# Patient Record
Sex: FEMALE | Race: WHITE | NOT HISPANIC OR LATINO | Employment: FULL TIME | URBAN - METROPOLITAN AREA
[De-identification: names, ages, dates, MRNs, and addresses within clinical notes are randomized per-mention and may not be internally consistent; named-entity substitution may affect disease eponyms.]

---

## 2020-10-10 ENCOUNTER — APPOINTMENT (EMERGENCY)
Dept: RADIOLOGY | Facility: HOSPITAL | Age: 25
End: 2020-10-10

## 2020-10-10 ENCOUNTER — HOSPITAL ENCOUNTER (EMERGENCY)
Facility: HOSPITAL | Age: 25
Discharge: HOME | End: 2020-10-10
Attending: EMERGENCY MEDICINE | Admitting: EMERGENCY MEDICINE

## 2020-10-10 VITALS
HEART RATE: 83 BPM | SYSTOLIC BLOOD PRESSURE: 120 MMHG | WEIGHT: 177 LBS | RESPIRATION RATE: 18 BRPM | OXYGEN SATURATION: 98 % | DIASTOLIC BLOOD PRESSURE: 65 MMHG | TEMPERATURE: 97.9 F

## 2020-10-10 DIAGNOSIS — R11.2 NAUSEA AND VOMITING: Primary | ICD-10-CM

## 2020-10-10 LAB
ALBUMIN SERPL BCP-MCNC: 4.3 G/DL (ref 3.4–4.8)
ALP SERPL-CCNC: 92.4 U/L (ref 35–140)
ALT SERPL W P-5'-P-CCNC: 58 U/L (ref 5–54)
ANION GAP SERPL CALCULATED.3IONS-SCNC: 9 MMOL/L (ref 4–13)
AST SERPL W P-5'-P-CCNC: 25 U/L (ref 15–41)
ATRIAL RATE: 89 BPM
BASE EXCESS BLDA CALC-SCNC: 0 MMOL/L (ref -2–3)
BASOPHILS # BLD AUTO: 0.02 THOUSANDS/ΜL (ref 0–0.1)
BASOPHILS NFR BLD AUTO: 0 % (ref 0–1)
BETA-HYDROXYBUTYRATE: 0.5 MMOL/L
BILIRUB SERPL-MCNC: 0.94 MG/DL (ref 0.3–1.2)
BILIRUB UR QL STRIP: NEGATIVE
BUN SERPL-MCNC: 12 MG/DL (ref 6–20)
CA-I BLD-SCNC: 1.16 MMOL/L (ref 1.12–1.32)
CALCIUM SERPL-MCNC: 9 MG/DL (ref 8.4–10.2)
CHLORIDE SERPL-SCNC: 100 MMOL/L (ref 96–108)
CLARITY UR: ABNORMAL
CO2 SERPL-SCNC: 26 MMOL/L (ref 22–33)
COLOR UR: YELLOW
CREAT SERPL-MCNC: 0.5 MG/DL (ref 0.4–1.1)
EOSINOPHIL # BLD AUTO: 0.03 THOUSAND/ΜL (ref 0–0.61)
EOSINOPHIL NFR BLD AUTO: 0 % (ref 0–6)
ERYTHROCYTE [DISTWIDTH] IN BLOOD BY AUTOMATED COUNT: 14.5 % (ref 11.6–15.1)
FIO2 GAS DIL.REBREATH: 21 L
GFR SERPL CREATININE-BSD FRML MDRD: 133 ML/MIN/1.73SQ M
GLUCOSE SERPL-MCNC: 160 MG/DL (ref 65–140)
GLUCOSE SERPL-MCNC: 208 MG/DL (ref 65–140)
GLUCOSE SERPL-MCNC: 220 MG/DL (ref 65–140)
GLUCOSE UR STRIP-MCNC: ABNORMAL MG/DL
HCO3 BLDA-SCNC: 25.6 MMOL/L (ref 24–30)
HCT VFR BLD AUTO: 37 % (ref 34.8–46.1)
HCT VFR BLD CALC: 36 % (ref 34.8–46.1)
HGB BLD-MCNC: 12.1 G/DL (ref 11.5–15.4)
HGB BLDA-MCNC: 12.2 G/DL (ref 11.5–15.4)
HGB UR QL STRIP.AUTO: NEGATIVE
IMM GRANULOCYTES # BLD AUTO: 0.01 THOUSAND/UL (ref 0–0.2)
IMM GRANULOCYTES NFR BLD AUTO: 0 % (ref 0–2)
KETONES UR STRIP-MCNC: ABNORMAL MG/DL
LEUKOCYTE ESTERASE UR QL STRIP: NEGATIVE
LYMPHOCYTES # BLD AUTO: 0.99 THOUSANDS/ΜL (ref 0.6–4.47)
LYMPHOCYTES NFR BLD AUTO: 9 % (ref 14–44)
MCH RBC QN AUTO: 27.8 PG (ref 26.8–34.3)
MCHC RBC AUTO-ENTMCNC: 32.7 G/DL (ref 31.4–37.4)
MCV RBC AUTO: 85 FL (ref 82–98)
MONOCYTES # BLD AUTO: 0.45 THOUSAND/ΜL (ref 0.17–1.22)
MONOCYTES NFR BLD AUTO: 4 % (ref 4–12)
NEUTROPHILS # BLD AUTO: 9.79 THOUSANDS/ΜL (ref 1.85–7.62)
NEUTS SEG NFR BLD AUTO: 87 % (ref 43–75)
NITRITE UR QL STRIP: NEGATIVE
P AXIS: 71 DEGREES
PCO2 BLD: 27 MMOL/L (ref 21–32)
PCO2 BLD: 42 MM HG (ref 42–50)
PCO2 BLD: 44 MM HG
PCO2 BLDA: 41.3 MM HG
PH BLD: 7.39 [PH] (ref 7.3–7.4)
PH BLD: 7.4 [PH]
PH UR STRIP.AUTO: 6 [PH]
PLATELET # BLD AUTO: 360 THOUSANDS/UL (ref 149–390)
PMV BLD AUTO: 10.1 FL (ref 8.9–12.7)
PO2 BLD: 45 MM HG (ref 35–45)
POTASSIUM BLD-SCNC: 3.6 MMOL/L (ref 3.5–5.3)
POTASSIUM SERPL-SCNC: 4 MMOL/L (ref 3.5–5)
PR INTERVAL: 161 MS
PROT SERPL-MCNC: 7.3 G/DL (ref 6.4–8.3)
PROT UR STRIP-MCNC: NEGATIVE MG/DL
QRS AXIS: 65 DEGREES
QRSD INTERVAL: 82 MS
QT INTERVAL: 362 MS
QTC INTERVAL: 438 MS
RBC # BLD AUTO: 4.35 MILLION/UL (ref 3.81–5.12)
SAO2 % BLD FROM PO2: 80 % (ref 60–85)
SODIUM BLD-SCNC: 134 MMOL/L (ref 136–145)
SODIUM SERPL-SCNC: 135 MMOL/L (ref 133–145)
SP GR UR STRIP.AUTO: >=1.03 (ref 1–1.03)
SPECIMEN SOURCE: ABNORMAL
T WAVE AXIS: 32 DEGREES
UROBILINOGEN UR QL STRIP.AUTO: 0.2 E.U./DL
VENTRICULAR RATE: 88 BPM
WBC # BLD AUTO: 11.29 THOUSAND/UL (ref 4.31–10.16)

## 2020-10-10 PROCEDURE — 82803 BLOOD GASES ANY COMBINATION: CPT

## 2020-10-10 PROCEDURE — 82330 ASSAY OF CALCIUM: CPT

## 2020-10-10 PROCEDURE — 96374 THER/PROPH/DIAG INJ IV PUSH: CPT

## 2020-10-10 PROCEDURE — 82948 REAGENT STRIP/BLOOD GLUCOSE: CPT

## 2020-10-10 PROCEDURE — 82947 ASSAY GLUCOSE BLOOD QUANT: CPT

## 2020-10-10 PROCEDURE — 82010 KETONE BODYS QUAN: CPT | Performed by: EMERGENCY MEDICINE

## 2020-10-10 PROCEDURE — 99284 EMERGENCY DEPT VISIT MOD MDM: CPT | Performed by: EMERGENCY MEDICINE

## 2020-10-10 PROCEDURE — 96375 TX/PRO/DX INJ NEW DRUG ADDON: CPT

## 2020-10-10 PROCEDURE — 85014 HEMATOCRIT: CPT

## 2020-10-10 PROCEDURE — 84295 ASSAY OF SERUM SODIUM: CPT

## 2020-10-10 PROCEDURE — 80053 COMPREHEN METABOLIC PANEL: CPT | Performed by: EMERGENCY MEDICINE

## 2020-10-10 PROCEDURE — 85025 COMPLETE CBC W/AUTO DIFF WBC: CPT | Performed by: EMERGENCY MEDICINE

## 2020-10-10 PROCEDURE — 36600 WITHDRAWAL OF ARTERIAL BLOOD: CPT

## 2020-10-10 PROCEDURE — 81003 URINALYSIS AUTO W/O SCOPE: CPT | Performed by: EMERGENCY MEDICINE

## 2020-10-10 PROCEDURE — 71045 X-RAY EXAM CHEST 1 VIEW: CPT

## 2020-10-10 PROCEDURE — 93010 ELECTROCARDIOGRAM REPORT: CPT | Performed by: INTERNAL MEDICINE

## 2020-10-10 PROCEDURE — 99284 EMERGENCY DEPT VISIT MOD MDM: CPT

## 2020-10-10 PROCEDURE — 36415 COLL VENOUS BLD VENIPUNCTURE: CPT | Performed by: EMERGENCY MEDICINE

## 2020-10-10 PROCEDURE — 96361 HYDRATE IV INFUSION ADD-ON: CPT

## 2020-10-10 PROCEDURE — 84132 ASSAY OF SERUM POTASSIUM: CPT

## 2020-10-10 PROCEDURE — 93005 ELECTROCARDIOGRAM TRACING: CPT

## 2020-10-10 RX ORDER — KETOROLAC TROMETHAMINE 30 MG/ML
30 INJECTION, SOLUTION INTRAMUSCULAR; INTRAVENOUS ONCE
Status: COMPLETED | OUTPATIENT
Start: 2020-10-10 | End: 2020-10-10

## 2020-10-10 RX ORDER — DIPHENHYDRAMINE HYDROCHLORIDE 50 MG/ML
50 INJECTION INTRAMUSCULAR; INTRAVENOUS ONCE
Status: COMPLETED | OUTPATIENT
Start: 2020-10-10 | End: 2020-10-10

## 2020-10-10 RX ORDER — METOCLOPRAMIDE HYDROCHLORIDE 5 MG/ML
10 INJECTION INTRAMUSCULAR; INTRAVENOUS ONCE
Status: COMPLETED | OUTPATIENT
Start: 2020-10-10 | End: 2020-10-10

## 2020-10-10 RX ADMIN — METOCLOPRAMIDE HYDROCHLORIDE 10 MG: 5 INJECTION INTRAMUSCULAR; INTRAVENOUS at 18:22

## 2020-10-10 RX ADMIN — DIPHENHYDRAMINE HYDROCHLORIDE 50 MG: 50 INJECTION, SOLUTION INTRAMUSCULAR; INTRAVENOUS at 18:19

## 2020-10-10 RX ADMIN — SODIUM CHLORIDE 1000 ML: 0.9 INJECTION, SOLUTION INTRAVENOUS at 19:07

## 2020-10-10 RX ADMIN — KETOROLAC TROMETHAMINE 30 MG: 30 INJECTION, SOLUTION INTRAMUSCULAR at 18:20

## 2020-10-10 RX ADMIN — SODIUM CHLORIDE 1000 ML: 0.9 INJECTION, SOLUTION INTRAVENOUS at 18:18

## 2023-10-17 ENCOUNTER — HOSPITAL ENCOUNTER (EMERGENCY)
Facility: HOSPITAL | Age: 28
Discharge: HOME | End: 2023-10-17
Attending: EMERGENCY MEDICINE
Payer: COMMERCIAL

## 2023-10-17 ENCOUNTER — APPOINTMENT (EMERGENCY)
Dept: RADIOLOGY | Facility: HOSPITAL | Age: 28
End: 2023-10-17
Payer: COMMERCIAL

## 2023-10-17 VITALS
WEIGHT: 175 LBS | DIASTOLIC BLOOD PRESSURE: 79 MMHG | OXYGEN SATURATION: 100 % | TEMPERATURE: 97.3 F | SYSTOLIC BLOOD PRESSURE: 129 MMHG | HEART RATE: 82 BPM | RESPIRATION RATE: 18 BRPM

## 2023-10-17 DIAGNOSIS — S06.0XAA CONCUSSION: ICD-10-CM

## 2023-10-17 DIAGNOSIS — S09.90XA INJURY OF HEAD, INITIAL ENCOUNTER: Primary | ICD-10-CM

## 2023-10-17 LAB
ANION GAP SERPL CALCULATED.3IONS-SCNC: 6 MMOL/L
BASE EX.OXY STD BLDV CALC-SCNC: 70.1 % (ref 60–80)
BASE EXCESS BLDV CALC-SCNC: -4.2 MMOL/L
BASOPHILS # BLD AUTO: 0.03 THOUSANDS/ÂΜL (ref 0–0.1)
BASOPHILS NFR BLD AUTO: 0 % (ref 0–1)
BETA-HYDROXYBUTYRATE: 0.3 MMOL/L
BILIRUB UR QL STRIP: NEGATIVE
BUN SERPL-MCNC: 8 MG/DL (ref 5–25)
CALCIUM SERPL-MCNC: 8.8 MG/DL (ref 8.4–10.2)
CHLORIDE SERPL-SCNC: 103 MMOL/L (ref 96–108)
CLARITY UR: CLEAR
CO2 SERPL-SCNC: 28 MMOL/L (ref 21–32)
COLOR UR: ABNORMAL
CREAT SERPL-MCNC: 0.54 MG/DL (ref 0.6–1.3)
EOSINOPHIL # BLD AUTO: 0.23 THOUSAND/ÂΜL (ref 0–0.61)
EOSINOPHIL NFR BLD AUTO: 3 % (ref 0–6)
ERYTHROCYTE [DISTWIDTH] IN BLOOD BY AUTOMATED COUNT: 14.3 % (ref 11.6–15.1)
EXT PREGNANCY TEST URINE: NEGATIVE
EXT. CONTROL: NORMAL
GFR SERPL CREATININE-BSD FRML MDRD: 127 ML/MIN/1.73SQ M
GLUCOSE SERPL-MCNC: 208 MG/DL (ref 65–140)
GLUCOSE UR STRIP-MCNC: ABNORMAL MG/DL
HCO3 BLDV-SCNC: 20.3 MMOL/L (ref 24–30)
HCT VFR BLD AUTO: 38.4 % (ref 34.8–46.1)
HGB BLD-MCNC: 12.4 G/DL (ref 11.5–15.4)
HGB UR QL STRIP.AUTO: NEGATIVE
IMM GRANULOCYTES # BLD AUTO: 0.02 THOUSAND/UL (ref 0–0.2)
IMM GRANULOCYTES NFR BLD AUTO: 0 % (ref 0–2)
KETONES UR STRIP-MCNC: ABNORMAL MG/DL
LEUKOCYTE ESTERASE UR QL STRIP: NEGATIVE
LYMPHOCYTES # BLD AUTO: 2.09 THOUSANDS/ÂΜL (ref 0.6–4.47)
LYMPHOCYTES NFR BLD AUTO: 24 % (ref 14–44)
MCH RBC QN AUTO: 28.2 PG (ref 26.8–34.3)
MCHC RBC AUTO-ENTMCNC: 32.3 G/DL (ref 31.4–37.4)
MCV RBC AUTO: 87 FL (ref 82–98)
MONOCYTES # BLD AUTO: 0.53 THOUSAND/ÂΜL (ref 0.17–1.22)
MONOCYTES NFR BLD AUTO: 6 % (ref 4–12)
NEUTROPHILS # BLD AUTO: 5.76 THOUSANDS/ÂΜL (ref 1.85–7.62)
NEUTS SEG NFR BLD AUTO: 67 % (ref 43–75)
NITRITE UR QL STRIP: NEGATIVE
NRBC BLD AUTO-RTO: 0 /100 WBCS
O2 CT BLDV-SCNC: 10.5 ML/DL
PCO2 BLDV: 35.3 MM HG (ref 42–50)
PH BLDV: 7.38 [PH] (ref 7.3–7.4)
PH UR STRIP.AUTO: 7 [PH]
PLATELET # BLD AUTO: 339 THOUSANDS/UL (ref 149–390)
PMV BLD AUTO: 9.8 FL (ref 8.9–12.7)
PO2 BLDV: 37.5 MM HG (ref 35–45)
POTASSIUM SERPL-SCNC: 4.3 MMOL/L (ref 3.5–5.3)
PROT UR STRIP-MCNC: NEGATIVE MG/DL
RBC # BLD AUTO: 4.4 MILLION/UL (ref 3.81–5.12)
SODIUM SERPL-SCNC: 137 MMOL/L (ref 135–147)
SP GR UR STRIP.AUTO: 1.01 (ref 1–1.03)
UROBILINOGEN UR QL STRIP.AUTO: 0.2 E.U./DL
WBC # BLD AUTO: 8.66 THOUSAND/UL (ref 4.31–10.16)

## 2023-10-17 PROCEDURE — 80048 BASIC METABOLIC PNL TOTAL CA: CPT | Performed by: EMERGENCY MEDICINE

## 2023-10-17 PROCEDURE — 70450 CT HEAD/BRAIN W/O DYE: CPT

## 2023-10-17 PROCEDURE — 81025 URINE PREGNANCY TEST: CPT | Performed by: EMERGENCY MEDICINE

## 2023-10-17 PROCEDURE — 36415 COLL VENOUS BLD VENIPUNCTURE: CPT | Performed by: EMERGENCY MEDICINE

## 2023-10-17 PROCEDURE — 82805 BLOOD GASES W/O2 SATURATION: CPT | Performed by: EMERGENCY MEDICINE

## 2023-10-17 PROCEDURE — 81003 URINALYSIS AUTO W/O SCOPE: CPT | Performed by: EMERGENCY MEDICINE

## 2023-10-17 PROCEDURE — 85025 COMPLETE CBC W/AUTO DIFF WBC: CPT | Performed by: EMERGENCY MEDICINE

## 2023-10-17 PROCEDURE — G1004 CDSM NDSC: HCPCS

## 2023-10-17 PROCEDURE — 82010 KETONE BODYS QUAN: CPT | Performed by: EMERGENCY MEDICINE

## 2023-10-17 RX ORDER — METOCLOPRAMIDE 10 MG/1
10 TABLET ORAL EVERY 6 HOURS
Qty: 10 TABLET | Refills: 0 | Status: SHIPPED | OUTPATIENT
Start: 2023-10-17 | End: 2023-10-20

## 2023-10-17 RX ORDER — KETOROLAC TROMETHAMINE 10 MG/1
10 TABLET, FILM COATED ORAL EVERY 6 HOURS PRN
Qty: 12 TABLET | Refills: 0 | Status: SHIPPED | OUTPATIENT
Start: 2023-10-17 | End: 2023-10-20

## 2023-10-17 RX ORDER — METOCLOPRAMIDE HYDROCHLORIDE 5 MG/ML
10 INJECTION INTRAMUSCULAR; INTRAVENOUS ONCE
Status: DISCONTINUED | OUTPATIENT
Start: 2023-10-17 | End: 2023-10-17 | Stop reason: HOSPADM

## 2023-10-17 RX ORDER — KETOROLAC TROMETHAMINE 30 MG/ML
15 INJECTION, SOLUTION INTRAMUSCULAR; INTRAVENOUS ONCE
Status: COMPLETED | OUTPATIENT
Start: 2023-10-17 | End: 2023-10-17

## 2023-10-17 RX ADMIN — SODIUM CHLORIDE 1000 ML: 0.9 INJECTION, SOLUTION INTRAVENOUS at 17:32

## 2023-10-17 RX ADMIN — KETOROLAC TROMETHAMINE 15 MG: 30 INJECTION, SOLUTION INTRAMUSCULAR at 19:16

## 2023-10-17 NOTE — DISCHARGE INSTRUCTIONS
Please do not exert yourself or involve yourself in activities that require physical effort until your symptoms are resolved. Drink plenty of fluids and take the medications as prescribed.

## 2023-10-17 NOTE — ED PROVIDER NOTES
History  Chief Complaint   Patient presents with    Head Injury     Hit head on a microwave door on Sunday and has had pain since and stated with vomiting today     17-year-old female presents with headache nausea photosensitivity after head trauma last Sunday where she hit her head on a microwave door. Did not pass out no neck stiffness no rash is a type I diabetic no diarrhea constipation visual disturbance strokelike symptoms has been compliant with her insulin. History provided by:  Patient   used: No        Prior to Admission Medications   Prescriptions Last Dose Informant Patient Reported? Taking?   insulin lispro (HumaLOG) 100 units/mL injection   Yes No   Sig: Inject under the skin daily as needed for high blood sugar Patient has insulin pump, uses humalog PRN based on BS      Facility-Administered Medications: None       Past Medical History:   Diagnosis Date    Diabetes mellitus ([address])     Type 1       Past Surgical History:   Procedure Laterality Date    WISDOM TOOTH EXTRACTION         History reviewed. No pertinent family history. I have reviewed and agree with the history as documented. E-Cigarette/Vaping     E-Cigarette/Vaping Substances     Social History     Tobacco Use    Smoking status: Never    Smokeless tobacco: Never   Substance Use Topics    Alcohol use: Yes    Drug use: Never       Review of Systems   Constitutional: Negative. HENT: Negative. Eyes: Negative. Respiratory: Negative. Cardiovascular: Negative. Gastrointestinal:  Positive for nausea. Endocrine: Negative. Genitourinary: Negative. Musculoskeletal: Negative. Skin: Negative. Allergic/Immunologic: Negative. Neurological:  Positive for headaches. Hematological: Negative. Psychiatric/Behavioral: Negative. All other systems reviewed and are negative. Physical Exam  Physical Exam  Vitals and nursing note reviewed.    Constitutional:       Appearance: Normal appearance. HENT:      Head: Normocephalic and atraumatic. Nose: Nose normal.      Mouth/Throat:      Mouth: Mucous membranes are moist.   Eyes:      Extraocular Movements: Extraocular movements intact. Pupils: Pupils are equal, round, and reactive to light. Neck:      Vascular: No carotid bruit. Cardiovascular:      Rate and Rhythm: Normal rate and regular rhythm. Pulmonary:      Effort: Pulmonary effort is normal.      Breath sounds: Normal breath sounds. Abdominal:      General: Abdomen is flat. Bowel sounds are normal.      Palpations: Abdomen is soft. Musculoskeletal:         General: Normal range of motion. Cervical back: Normal range of motion and neck supple. No rigidity or tenderness. Lymphadenopathy:      Cervical: No cervical adenopathy. Skin:     General: Skin is warm. Capillary Refill: Capillary refill takes less than 2 seconds. Neurological:      General: No focal deficit present. Mental Status: She is alert and oriented to person, place, and time. Mental status is at baseline. Cranial Nerves: No cranial nerve deficit. Sensory: No sensory deficit. Motor: No weakness. Coordination: Coordination normal.      Gait: Gait normal.      Deep Tendon Reflexes: Reflexes normal.   Psychiatric:         Mood and Affect: Mood normal.         Thought Content:  Thought content normal.         Vital Signs  ED Triage Vitals [10/17/23 1543]   Temperature Pulse Respirations Blood Pressure SpO2   (!) 97.3 °F (36.3 °C) 82 18 129/79 100 %      Temp src Heart Rate Source Patient Position - Orthostatic VS BP Location FiO2 (%)   -- -- -- -- --      Pain Score       8           Vitals:    10/17/23 1543   BP: 129/79   Pulse: 82         Visual Acuity  Visual Acuity      Flowsheet Row Most Recent Value   L Pupil Size (mm) 3   R Pupil Size (mm) 3            ED Medications  Medications   metoclopramide (REGLAN) injection 10 mg (10 mg Intravenous Not Given 10/17/23 7558) sodium chloride 0.9 % bolus 1,000 mL (0 mL Intravenous Stopped 10/17/23 1917)   ketorolac (TORADOL) injection 15 mg (15 mg Intravenous Given 10/17/23 1916)       Diagnostic Studies  Results Reviewed       Procedure Component Value Units Date/Time    UA (URINE) with reflex to Scope [[id number]]  (Abnormal) Collected: 10/17/23 1728    Lab Status: Final result Specimen: Urine, Clean Catch Updated: 10/17/23 1746     Color, UA Light Yellow     Clarity, UA Clear     Specific Gravity, UA 1.010     pH, UA 7.0     Leukocytes, UA Negative     Nitrite, UA Negative     Protein, UA Negative mg/dl      Glucose,  (1/4%) mg/dl      Ketones, UA 15 (1+) mg/dl      Urobilinogen, UA 0.2 E.U./dl      Bilirubin, UA Negative     Occult Blood, UA Negative    Basic metabolic panel [[id number]]  (Abnormal) Collected: 10/17/23 1717    Lab Status: Final result Specimen: Blood from Arm, Right Updated: 10/17/23 1744     Sodium 137 mmol/L      Potassium 4.3 mmol/L      Chloride 103 mmol/L      CO2 28 mmol/L      ANION GAP 6 mmol/L      BUN 8 mg/dL      Creatinine 0.54 mg/dL      Glucose 208 mg/dL      Calcium 8.8 mg/dL      eGFR 127 ml/min/1.73sq m     Narrative:      [place] guidelines for Chronic Kidney Disease (CKD):     Stage 1 with normal or high GFR (GFR > 90 mL/min/1.73 square meters)    Stage 2 Mild CKD (GFR = 60-89 mL/min/1.73 square meters)    Stage 3A Moderate CKD (GFR = 45-59 mL/min/1.73 square meters)    Stage 3B Moderate CKD (GFR = 30-44 mL/min/1.73 square meters)    Stage 4 Severe CKD (GFR = 15-29 mL/min/1.73 square meters)    Stage 5 End Stage CKD (GFR <15 mL/min/1.73 square meters)  Note: GFR calculation is accurate only with a steady state creatinine    POCT pregnancy, urine [[id number]]  (Normal) Resulted: 10/17/23 1733    Lab Status: Final result Updated: 10/17/23 1733     EXT Preg Test, Ur Negative     Control Valid    Beta Hydroxybutyrate [[id number]]  (Normal) Collected: 10/17/23 1717    Lab Status: Final result Specimen: Blood from Arm, Right Updated: 10/17/23 1732     BETA-HYDROXYBUTYRATE 0.3 mmol/L     Blood gas, venous [[id number]]  (Abnormal) Collected: 10/17/23 1717    Lab Status: Final result Specimen: Blood from Arm, Right Updated: 10/17/23 1727     pH, Yrn 7.378     pCO2, Yrn 35.3 mm Hg      pO2, Yrn 37.5 mm Hg      HCO3, Yrn 20.3 mmol/L      Base Excess, Yrn -4.2 mmol/L      O2 Content, Yrn 10.5 ml/dL      O2 HGB, VENOUS 70.1 %     CBC and differential [[id number]] Collected: 10/17/23 1717    Lab Status: Final result Specimen: Blood from Arm, Right Updated: 10/17/23 1725     WBC 8.66 Thousand/uL      RBC 4.40 Million/uL      Hemoglobin 12.4 g/dL      Hematocrit 38.4 %      MCV 87 fL      MCH 28.2 pg      MCHC 32.3 g/dL      RDW 14.3 %      MPV 9.8 fL      Platelets 085 Thousands/uL      nRBC 0 /100 WBCs      Neutrophils Relative 67 %      Immat GRANS % 0 %      Lymphocytes Relative 24 %      Monocytes Relative 6 %      Eosinophils Relative 3 %      Basophils Relative 0 %      Neutrophils Absolute 5.76 Thousands/µL      Immature Grans Absolute 0.02 Thousand/uL      Lymphocytes Absolute 2.09 Thousands/µL      Monocytes Absolute 0.53 Thousand/µL      Eosinophils Absolute 0.23 Thousand/µL      Basophils Absolute 0.03 Thousands/µL                    CT head without contrast   Final Result by Carolyn Potter MD (10/17 1905)      No acute intracranial abnormality. Workstation performed: [id number]                    Procedures  Procedures         ED Course                               SBIRT 20yo+      Flowsheet Row Most Recent Value   Initial Alcohol Screen: US AUDIT-C     1. How often do you have a drink containing alcohol? 0 Filed at: 10/17/2023 1547   2. How many drinks containing alcohol do you have on a typical day you are drinking? 0 Filed at: 10/17/2023 1547   3a. Male UNDER 65: How often do you have five or more drinks on one occasion? 0 Filed at: 10/17/2023 1547   3b.  FEMALE Any Age, or MALE 65+: How often do you have 4 or more drinks on one occassion? 0 Filed at: 10/17/2023 1547   Audit-C Score 0 Filed at: 10/17/2023 1547   BRIDGETTE: How many times in the past year have you. .. Used an illegal drug or used a prescription medication for non-medical reasons? Never Filed at: 10/17/2023 1547                      Medical Decision Making  Patient evaluated with labs  imaging. I reviewed the results and discussed them with the patient. Patient discharged with appropriate instructions medications and follow-up. Patient verbalized understanding had no further questions at the time of discharge. Patient had stable vital signs and well-appearing at the time of discharge. Amount and/or Complexity of Data Reviewed  External Data Reviewed: notes. Labs: ordered. Decision-making details documented in ED Course. Radiology: ordered. Decision-making details documented in ED Course. Risk  Prescription drug management. Disposition  Final diagnoses:   Injury of head, initial encounter   Concussion     Time reflects when diagnosis was documented in both MDM as applicable and the Disposition within this note       Time User Action Codes Description Comment    10/17/2023  7:09 PM Brenda Blevins Add [S09.90XA] Injury of head, initial encounter     10/17/2023  7:09 PM Brenda Blevins Add [S06. 0XAA] Concussion           ED Disposition       ED Disposition   Discharge    Condition   Stable    Date/Time   Tue Oct 17, 2023 1856    Comment   Janneth Jason discharge to home/self care.                    Follow-up Information       Follow up With Specialties Details Why Contact Info Additional Information    [address] Emergency Department Emergency Medicine  If symptoms worsen [address] [address]  [address] [facility] Emergency Department, [address] [facility] [address] [place] [address], [id number]            Discharge Medication List as of 10/17/2023 7:10 PM        START taking these medications    Details   ketorolac (TORADOL) 10 mg tablet Take 1 tablet (10 mg total) by mouth every 6 (six) hours as needed for moderate pain for up to 3 days, Starting Tue 10/17/2023, Until Fri 10/20/2023 at 2359, Normal      metoclopramide (REGLAN) 10 mg tablet Take 1 tablet (10 mg total) by mouth every 6 (six) hours for 3 days, Starting Tue 10/17/2023, Until Fri 10/20/2023, Normal           CONTINUE these medications which have NOT CHANGED    Details   insulin lispro (HumaLOG) 100 units/mL injection Inject under the skin daily as needed for high blood sugar Patient has insulin pump, uses humalog PRN based on BS, Historical Med             No discharge procedures on file.     PDMP Review       None            ED Provider  Electronically Signed by             Allan Dela Cruz DO  10/17/23 1931

## 2024-11-20 NOTE — PROGRESS NOTES
Assessment & Plan   Diagnoses and all orders for this visit:    Amenorrhea  -     AMB US OB < 14 weeks single or first gestation level 1    6 weeks gestation of pregnancy  -     Ambulatory Referral to Maternal Fetal Medicine; Future  Type 1 diabetes mellitus in pregnancy, first trimester  -     Ambulatory Referral to Maternal Fetal Medicine; Future    Discussion  Viable IUP at  6w4d - JOSH established to 25 based on ultrasound today  Encouraged the flu vaccine and COVID booster in pregnancy; Encouraged infection prevention/handwashing. Declines flu vac.  Referral placed for MFM to schedule early anatomy scan and review options for genetic screening; Referral placed to establish care with diabetes in pregnancy program  RTO in 2 weeks for OB INTAKE with OB nurse navigator  RTO in 4 weeks for INITIAL PRENATAL - routine ob check with physical exam or sooner if needed    Subjective     HPI  29 y.o.  who presents as a new patient with amenorrhea. LMP is an exact date of 24. This makes her 11w4d corresponding to an JOSH of 25.   Periods are usually reg q month (28 day cycle).   This is an unplanned but welcomed pregnancy.  H/o T1DM - follows with endocrine  (+) n/v - comes and goes throughout the day; (other pregnancies she was more sick); Denies HA/cramping/vb/lof/edema/dv/smoking; urine neg/4+ glucose  PNVs + DHA - tolerating daily; r/ryann 1 mg folic acid and DHA daily    TV us done - single viable IUP measuring 0.81 cm by CRL at 6w4d; (+) FHM of 117 bpm;    Assigning a Final JOSH  Please choose how you are assigning the JOSH: The gestational age by LMP is 9w 0d - 13w 6d and demonstrates more than 7 days difference from the gestational age by CRL, therefore the final JOSH will be based on the ultrasound at this encounter    Final JOSH: 25 by ultrasound at this encounter.    Does not believe she had a period in October. First (+) UPT 10/29/24    Jose Eduardo Ramirez PA-C  [facility]  [facility]  "[facility]  [address] [facility] [address]  [place] [id number]    Ultrasound Probe Disinfection    A transvaginal ultrasound was performed.   Probe identification: probe serial number [facility]: [id number]  Disinfection process: Disinfection was performed with High Level Disinfection Process (Trophon)    Jose Eduardo Ramirez PA-C  24  10:43 AM    OB History    Para Term  AB Living   5 3 3  1 3   SAB IAB Ectopic Multiple Live Births   1    3      # Outcome Date GA Lbr Estuardo/2nd Weight Sex Type Anes PTL Lv   5 Current            4 Term 20 37w1d  3680 g (8 lb 1.8 oz) F Vag-Spont EPI  BEN   3 Term 17 38w0d  3856 g (8 lb 8 oz) F Vag-Spont EPI N BEN   2 SAB  3w0d          1 Term  37w0d   M Vag-Spont   BEN       Past Medical History:   Diagnosis Date    Diabetes mellitus (HCC)     Type 1         Current Outpatient Medications:     Ferrous Sulfate (IRON PO), Take 1 tablet by mouth daily, Disp: , Rfl:     Gvoke Kit 1 MG/0.2ML SOLN, INJECT 1MG UNDER THE SKIN AS NEEDED FOR SEVERE HYPOGLYCEMIA, Disp: , Rfl:     insulin lispro (HumaLOG) 100 units/mL injection, Inject under the skin daily as needed for high blood sugar Patient has insulin pump, uses humalog PRN based on BS, Disp: , Rfl:     Prenat w/o S-PU-Qphydbf-FA-DHA (PNV-DHA) 27-0.6-0.4-300 MG CAPS, Take by mouth, Disp: , Rfl:     Sodium Caprylate POWD, Take by mouth daily, Disp: , Rfl:     No Known Allergies    Objective   Vitals:    24 0938   BP: 110/76   BP Location: Left arm   Patient Position: Sitting   Cuff Size: Standard   Weight: 69.9 kg (154 lb)   Height: 5' 4\" (1.626 m)     Physical Exam  Vitals reviewed.   Constitutional:       General: She is not in acute distress.     Appearance: Normal appearance. She is not ill-appearing, toxic-appearing or diaphoretic.   HENT:      Head: Normocephalic and atraumatic.   Eyes:      Conjunctiva/sclera: Conjunctivae normal.   Neurological:      Mental Status: She is alert and oriented to " person, place, and time.   Psychiatric:         Mood and Affect: Mood normal.         Behavior: Behavior normal.         Thought Content: Thought content normal.         Judgment: Judgment normal.

## 2024-11-25 ENCOUNTER — ULTRASOUND (OUTPATIENT)
Dept: OBGYN CLINIC | Facility: CLINIC | Age: 29
End: 2024-11-25
Payer: COMMERCIAL

## 2024-11-25 VITALS
SYSTOLIC BLOOD PRESSURE: 110 MMHG | WEIGHT: 154 LBS | HEIGHT: 64 IN | BODY MASS INDEX: 26.29 KG/M2 | DIASTOLIC BLOOD PRESSURE: 76 MMHG

## 2024-11-25 DIAGNOSIS — Z3A.01 6 WEEKS GESTATION OF PREGNANCY: ICD-10-CM

## 2024-11-25 DIAGNOSIS — O24.011 TYPE 1 DIABETES MELLITUS IN PREGNANCY, FIRST TRIMESTER: ICD-10-CM

## 2024-11-25 DIAGNOSIS — N91.2 AMENORRHEA: Primary | ICD-10-CM

## 2024-11-25 PROCEDURE — 99204 OFFICE O/P NEW MOD 45 MIN: CPT | Performed by: PHYSICIAN ASSISTANT

## 2024-11-25 PROCEDURE — 76817 TRANSVAGINAL US OBSTETRIC: CPT | Performed by: PHYSICIAN ASSISTANT

## 2024-11-25 RX ORDER — SODIUM CAPRYLATE
POWDER (GRAM) MISCELLANEOUS DAILY
COMMUNITY

## 2024-11-25 RX ORDER — GLUCAGON INJECTION, SOLUTION 1 MG/.2ML
INJECTION, SOLUTION SUBCUTANEOUS
COMMUNITY

## 2024-11-25 RX ORDER — ASCORBIC ACID, CHOLECALCIFEROL, .ALPHA.-TOCOPHEROL ACETATE, DL-, PYRIDOXINE, FOLIC ACID, CYANOCOBALAMIN, CALCIUM, FERROUS FUMARATE, MAGNESIUM, DOCONEXENT 85; 200; 10; 25; 1; 12; 140; 27; 45; 300 [IU]/1; [IU]/1; [IU]/1; [IU]/1; MG/1; UG/1; MG/1; MG/1; MG/1; MG/1
CAPSULE, GELATIN COATED ORAL
COMMUNITY

## 2024-11-26 ENCOUNTER — TELEPHONE (OUTPATIENT)
Age: 29
End: 2024-11-26

## 2024-12-03 ENCOUNTER — NURSE TRIAGE (OUTPATIENT)
Age: 29
End: 2024-12-03

## 2024-12-03 NOTE — TELEPHONE ENCOUNTER
"Patient calling in stating that she's 7w5d . Pt stating that today pt had brown discharge when wiping. Pt denies recent sexual intercourse.  Pt reporting feeling weak and lightheaded,that started just after the vaginal bleeding started. Pt stating she's able to stand and walk but needs to \"move slowly\".  Pt denies passed tissue, discharge, fevers, cramping, pain.     Epic Secure Chat sent to Dr Larson- on call  Epic Secure Chat received: Given brown discharge and no overt bleeding I would advise rest and hydration and if any increasing bleeding to please have her call back. Can you see when her next apt is?     MD was notified that her intake is tomorrow    [employer] Secure Chat received: Can you see if she has had an ultrasound yet? I can't see well on my phone. Also ask her to check her fingerstick given complaints and her being a T1 diabetic  Thank you     MD was notified: yes she did at 6w4d and she's now 7w5d   she's checking her fingerstick now  fingerstick is 135     Epic Secure Chat received: Ok as above- any increasing concerns she should return call and monitor and rest for now     Pt was notified of Dr Larson' recommendations, pt verbalized understanding, no further questions at this time.       Reason for Disposition   SPOTTING (single or brief episode)    Answer Assessment - Initial Assessment Questions  1. ONSET: \"When did this bleeding start?\"        Today   2. BLEEDING SEVERITY: \"Describe the bleeding that you are having.\" \"How much bleeding is there?\"       Bleeding when wiping brown in color   3. ABDOMEN PAIN: \"Do you have any pain?\" \"How bad is the pain?\"  (e.g., Scale 0-10; none, mild, moderate, or severe)      Pt denies   4. PREGNANCY: \"Do you know how many weeks or months pregnant you are?\" \"When was the first day of your last normal menstrual period?\"      7w5d   5. ULTRASOUND: \"Have you had an ultrasound during this pregnancy?\"  Note: To confirm intrauterine pregnancy, placenta " Impression: Other chronic allergic conjunctivitis: H10.45. Plan: Cold compresses and artificial tears as needed for comfort. May use Pataday QD OU. Refer to Allergist.
RTC 2 years or PRN. "location.      Pt reporting having first ultrasound   6. HEMODYNAMIC STATUS: \"Are you weak or feeling lightheaded?\" If Yes, ask: \"Can you stand and walk normally?\"       Pt reporting feeling weak and lightheaded,that started just after the vaginal bleeding started. Pt stating she's able to stand and walk but needs to \"move slowly\".   7. OTHER SYMPTOMS: \"What other symptoms are you having with the bleeding?\" (e.g., passed tissue, vaginal discharge, fever, menstrual-type cramps)      Pt denies passed tissue, discharge, fevers, cramping    Protocols used: Pregnancy - Vaginal Bleeding Less Than 20 Weeks EGA-Adult-[place]    "

## 2024-12-04 ENCOUNTER — INITIAL PRENATAL (OUTPATIENT)
Dept: OBGYN CLINIC | Facility: CLINIC | Age: 29
End: 2024-12-04

## 2024-12-04 VITALS
SYSTOLIC BLOOD PRESSURE: 118 MMHG | BODY MASS INDEX: 26.22 KG/M2 | WEIGHT: 153.6 LBS | HEIGHT: 64 IN | DIASTOLIC BLOOD PRESSURE: 76 MMHG

## 2024-12-04 DIAGNOSIS — Z34.81 PRENATAL CARE, SUBSEQUENT PREGNANCY, FIRST TRIMESTER: ICD-10-CM

## 2024-12-04 DIAGNOSIS — Z36.89 ENCOUNTER FOR OTHER SPECIFIED ANTENATAL SCREENING: Primary | ICD-10-CM

## 2024-12-04 DIAGNOSIS — D56.9 THALASSEMIA, UNSPECIFIED TYPE: ICD-10-CM

## 2024-12-04 PROCEDURE — NOBC: Performed by: PHYSICIAN ASSISTANT

## 2024-12-04 NOTE — TELEPHONE ENCOUNTER
Patient is having some brown vaginal spotting with wiping but reports less than yesterday, no cramping.  Has OB intake appointment today @ 3:00 pm.  Will keep as scheduled.

## 2024-12-04 NOTE — PATIENT INSTRUCTIONS
Congratulations!! Please review our Pregnancy Essential Guide and [facility] L&D Virtual tour from our networks website.     [facility] Pregnancy Essentials Guide  [facility] ([email or web address])     [facility] - Virtual Tour ([employer]) Nurynestor Gregoria,     It was so nice getting to know you today. Remember if you have an urgent or time sensitive concern, please call the practice phone number so a clinical triage team member can review your symptoms and get in touch with our on call provider if necessary. If you have general questions or need help navigating our services please REPLY to this message so it comes directly to me and I will respond between other patients. If I am out of the office for any reason, another nurse navigator may reach out to help you. Our Pregnancy Essential Guide is a great online resource--please use the link below.     [facility] Pregnancy Essentials Guide  [facility] ([email or web address])     Again, Congratulations and Thank You for choosing [facility]. I look forward to helping you through this journey. Reach out -   Palak BERUMEN RN

## 2024-12-04 NOTE — PROGRESS NOTES
OB INTAKE INTERVIEW  Patient is 29 y.o. who presents for OB intake at 7 wks 6 days  She is accompanied by hwerself during this encounter  The father of her baby (Corona Martin) is involved in the pregnancy and is 33 years old.      Last Menstrual Period: 2024 (exact) - 12 wk 6 days today by LMP   Ultrasound: Measured 6 weeks 4 days on 2024  Estimated Date of Delivery: 2025 changed & confirmed by US    Signs/Symptoms of Pregnancy  Current pregnancy symptoms: nausea, vomiting once/day q 2-3 days, breast tenderness, urinary frequency  Constipation no  Headaches no  Cramping/spotting YES - brown spotting 12/3/2024 & 2024 (unrelated to intercourse)  PICA cravings no    Diabetes-  There is no height or weight on file to calculate BMI.  If patient has 1 or more, please order early 1 hour GTT  History of GDM - has Type 1 DM  BMI >35 no  History of PCOS or current metformin use no  History of LGA/macrosomic infant (4000g/9lbs) no    If patient has 2 or more, please order early 1 hour GTT  BMI>30 no  AMA no  First degree relative with type 2 diabetes no  History of chronic HTN, hyperlipidemia, elevated A1C no  High risk race (, , ,  or ) no    Hypertension- if you answer yes to any of the following, please order baseline preeclampsia labs (cbc, comprehensive metabolic panel, urine protein creatinine ratio, uric acid)  History of of chronic HTN no  History of gestational HTN no  History of preeclampsia, eclampsia, or HELLP syndrome no  History of diabetes no  History of lupus,sjogrens syndrome, kidney disease no    Thyroid- if yes order TSH with reflex T4  History of thyroid disease no    Bleeding Disorder or Hx of DVT-patient or first degree relative with history of. Order the following if not done previously.   (Factor V, antithrombin III, prothrombin gene mutation, protein C and S Ag, lupus anticoagulant, anticardiolipin, beta-2  glycoprotein)   no    OB/GYN-  History of abnormal pap smear no       Date of last pap smear 2021 (wnl)  History of HPV no  History of Herpes/HSV no  History of other STI (gonorrhea, chlamydia, trich) no  History of prior  YES  History of prior  no  History of  delivery prior to 36 weeks 6 days no  History of Varicella or Vaccination patient had chickenpox in childhood  History of blood transfusion no  Ok for blood transfusion YES    Substance screening-   History of tobacco use no  Currently using tobacco no  Substance Use Screen Level (N/A, LOW, HIGH) N/A    MRSA Screening-   Does the pt have a hx of MRSA? no    Immunizations:  Influenza vaccine given this season NO - recommended in pregnancy  Discussed Tdap vaccine YES - patient declines  Discussed COVID Vaccine YES - no Covid vaccines, patient had Covid x 1    Genetic/MFM-  Do you or your partner have a history of any of the following in yourselves or first degree relatives?  Cystic fibrosis no  Spinal muscular atrophy no  Hemoglobinopathy/Sickle Cell/Thalassemia no  Fragile X Intellectual Disability no    If yes, discuss Carrier Screening and recommend consultation with MFM/Genetic Counseling and place specific [facility] Referral for.    If no, discuss Carrier Screening being completed once in a lifetime as a standard of care lab test. Place orders for Cystic Fibrosis Gene Test (ATR256) and Spinal Muscular Atrophy DNA (PNM0975)  - had (-) CF & SMA carrier screening 2021    Appointment for Nuchal Translucency Ultrasound at [facility] scheduled for - needs to schedule N/T US ([facility] had left message for patient & MyChart message) - phone# provided      Interview education  [facility] Pregnancy Essentials Book reviewed, discussed and attached to their AVS YES    Nurse/Family Partnership- patient may qualify; referral placed NO    Prenatal lab work scripts YES ([facility])  Extra labs ordered:  Hgb electrophoresis    Aspirin/Preeclampsia Screen    Risk  Level Risk Factor Recommendation   LOW Prior Uncomplicated full-term delivery no No Aspirin recommendation        MODERATE Nulliparity no Recommend low-dose aspirin if     BMI>30 no 2 or more moderate risk factors    Family History Preeclampsia (mother/sister) no     35yr old or greater no     Black Race, Concern for SDOH/Low Socioeconomic no     IVF Pregnancy  no     Personal History Risks (low birth weight, prior adverse preg outcome, >10yr preg interval) no         HIGH History of Preeclampsia no Recommend low-dose aspirin if     Multifetal gestation no 1 or more high risk factors    Chronic HTN no     Type 1 or 2 Diabetes no     Renal Disease no     Autoimmune Disease  no      Contraindications to ASA therapy:  NSAID/ ASA allergy: no  Nasal polyps: no  Asthma with history of ASA induced bronchospasm: no  Relative contraindications:  History of GI bleed: no  Active peptic ulcer disease: no  Severe hepatic dysfunction: no    Patient should be recommended to take ASA 162mg during this pregnancy from 12-36wks to lower her risk of preeclampsia: no risk factors per screening protocol      The patient has a history now or in prior pregnancy notable for:    - type 1 DM (insulin pump)  - 1st trimester spotting (brown)      Details that I feel the provider should be aware of:  -  - limits gluten in diet  - stay at home mom   - patient is up to date on q 6 month dental cleanings  - plans travel to [facility] 5/2025  - will be moving to [place] 1/2025      PN1 visit scheduled. The patient was oriented to our practice, the navigator role, reviewed delivering physicians and [facility] for Delivery. All questions were answered.    Interviewed by: PATRICK Oakes RN

## 2024-12-08 ENCOUNTER — NURSE TRIAGE (OUTPATIENT)
Dept: OTHER | Facility: OTHER | Age: 29
End: 2024-12-08

## 2024-12-08 NOTE — TELEPHONE ENCOUNTER
"Reason for Disposition   SPOTTING after sexual intercourse (single or brief episode)    Answer Assessment - Initial Assessment Questions  1. ONSET: \"When did this bleeding start?\"          Tissue passed on 12/7 the size of a grape with bright red blood    2. BLEEDING SEVERITY: \"Describe the bleeding that you are having.\" \"How much bleeding is there?\"         Brown discharge currently    3. ABDOMEN PAIN: \"Do you have any pain?\" \"How bad is the pain?\"  (e.g., Scale 0-10; none, mild, moderate, or severe)        Denies    4. PREGNANCY: \"Do you know how many weeks or months pregnant you are?\" \"When was the first day of your last normal menstrual period?\"        GA: 8w3d    5. ULTRASOUND: \"Have you had an ultrasound during this pregnancy?\"  Note: To confirm intrauterine pregnancy, placenta location.        Yes    6. HEMODYNAMIC STATUS: \"Are you weak or feeling lightheaded?\" If Yes, ask: \"Can you stand and walk normally?\"         Patient reports fatigue and weakness    7. OTHER SYMPTOMS: \"What other symptoms are you having with the bleeding?\" (e.g., passed tissue, vaginal discharge, fever, menstrual-type cramps)        Passed tissue, denies fever    Protocols used: Pregnancy - Vaginal Bleeding Less Than 20 Weeks EGA-Adult-AH    Patient reported the episode of tissue/bleeding occurred last night after intercourse and the discharge changed to a dark brown over the course of 24 hours. She reports the quantity to be less than a dime in size. Provided home care advice per protocol and advised patient to call back for worsening symptoms or other questions. Patient verbalized understanding.   "

## 2024-12-10 ENCOUNTER — TELEPHONE (OUTPATIENT)
Dept: OBGYN CLINIC | Facility: CLINIC | Age: 29
End: 2024-12-10

## 2024-12-10 NOTE — TELEPHONE ENCOUNTER
Lmom, pt does not need appt for 12/12 unless she is coming in for a problem visit? She is correctly scheduled to be seen for 12/24 for her initial.

## 2024-12-24 ENCOUNTER — INITIAL PRENATAL (OUTPATIENT)
Dept: OBGYN CLINIC | Facility: CLINIC | Age: 29
End: 2024-12-24

## 2024-12-24 VITALS
HEIGHT: 64 IN | WEIGHT: 157.4 LBS | SYSTOLIC BLOOD PRESSURE: 100 MMHG | BODY MASS INDEX: 26.87 KG/M2 | DIASTOLIC BLOOD PRESSURE: 64 MMHG

## 2024-12-24 DIAGNOSIS — Z3A.10 10 WEEKS GESTATION OF PREGNANCY: Primary | ICD-10-CM

## 2024-12-24 PROCEDURE — PNV: Performed by: PHYSICIAN ASSISTANT

## 2024-12-24 PROCEDURE — G0145 SCR C/V CYTO,THINLAYER,RESCR: HCPCS | Performed by: PHYSICIAN ASSISTANT

## 2024-12-24 PROCEDURE — 87591 N.GONORRHOEAE DNA AMP PROB: CPT | Performed by: PHYSICIAN ASSISTANT

## 2024-12-24 PROCEDURE — 87491 CHLMYD TRACH DNA AMP PROBE: CPT | Performed by: PHYSICIAN ASSISTANT

## 2024-12-24 PROCEDURE — G0476 HPV COMBO ASSAY CA SCREEN: HCPCS | Performed by: PHYSICIAN ASSISTANT

## 2024-12-24 NOTE — PROGRESS NOTES
OB/GYN  PN Visit  Gregoria Martin  [id number]  2024  8:01 AM  Emilee Gan PA-C    S: 29 y.o.  10w5d here for PN visit. Pregnancy complicated by T1DM.     OB Complaints:  Denies c/o n/v/ha, no edema, no smoking, no DV.   No vb/lof  No cramping/ctxns or signs of PTL.    She reports that her nausea and vomiting has improved. She notes that she will still have some light spotting some days. No heavy bleeding. Comes and goes.   She notes she has not yet heard from [facility].     O:    Pre-Leda Vitals    Flowsheet Row Most Recent Value   Prenatal Assessment    Fetal Heart Rate 175   Prenatal Vitals    Blood Pressure 100/64   Weight - Scale 71.4 kg (157 lb 6.4 oz)   Urine Albumin/Glucose    Dilation/Effacement/Station    Vaginal Drainage    Draining Fluid No   Edema    LLE Edema None   RLE Edema None   Facial Edema None            Gen: no acute distress, nonlabored breathing.  OB exam completed: fundal height, +FHT  Urine: -/-    A/P:  #1. 10w5d GESTATION  Physical exam and cultures done today. Last pap: . Pap done today per ASCCP guidelines.     I reached out to [facility] to have then touch base w pt to schedule.   Encouraged to have labs drawn in the next week or so.     RTC in 4 weeks

## 2024-12-26 LAB
HPV HR 12 DNA CVX QL NAA+PROBE: NEGATIVE
HPV16 DNA CVX QL NAA+PROBE: NEGATIVE
HPV18 DNA CVX QL NAA+PROBE: NEGATIVE

## 2024-12-27 LAB
C TRACH DNA SPEC QL NAA+PROBE: NEGATIVE
N GONORRHOEA DNA SPEC QL NAA+PROBE: NEGATIVE

## 2024-12-30 LAB
LAB AP GYN PRIMARY INTERPRETATION: NORMAL
LAB AP LMP: NORMAL
Lab: NORMAL

## 2025-01-09 ENCOUNTER — APPOINTMENT (EMERGENCY)
Dept: ULTRASOUND IMAGING | Facility: HOSPITAL | Age: 30
End: 2025-01-09
Payer: COMMERCIAL

## 2025-01-09 ENCOUNTER — HOSPITAL ENCOUNTER (EMERGENCY)
Facility: HOSPITAL | Age: 30
Discharge: HOME | End: 2025-01-09
Attending: EMERGENCY MEDICINE
Payer: COMMERCIAL

## 2025-01-09 ENCOUNTER — TELEPHONE (OUTPATIENT)
Dept: OBGYN CLINIC | Facility: CLINIC | Age: 30
End: 2025-01-09

## 2025-01-09 VITALS
SYSTOLIC BLOOD PRESSURE: 107 MMHG | OXYGEN SATURATION: 99 % | HEART RATE: 96 BPM | RESPIRATION RATE: 18 BRPM | TEMPERATURE: 98.7 F | DIASTOLIC BLOOD PRESSURE: 66 MMHG

## 2025-01-09 DIAGNOSIS — O02.1 FETAL DEMISE BEFORE 20 WEEKS WITH RETENTION OF DEAD FETUS: Primary | ICD-10-CM

## 2025-01-09 DIAGNOSIS — R11.2 NAUSEA AND VOMITING: ICD-10-CM

## 2025-01-09 DIAGNOSIS — O02.1 MISSED ABORTION WITH FETAL DEMISE BEFORE 20 COMPLETED WEEKS OF GESTATION: ICD-10-CM

## 2025-01-09 LAB
ABO GROUP BLD: NORMAL
ALBUMIN SERPL BCG-MCNC: 4.3 G/DL (ref 3.5–5)
ALP SERPL-CCNC: 62 U/L (ref 34–104)
ALT SERPL W P-5'-P-CCNC: 24 U/L (ref 7–52)
ANION GAP SERPL CALCULATED.3IONS-SCNC: 13 MMOL/L (ref 4–13)
AST SERPL W P-5'-P-CCNC: 15 U/L (ref 13–39)
B-OH-BUTYR SERPL-MCNC: 1.36 MMOL/L (ref 0.02–0.27)
BASE EX.OXY STD BLDV CALC-SCNC: 73.1 % (ref 60–80)
BASE EXCESS BLDV CALC-SCNC: -3.9 MMOL/L
BASOPHILS # BLD AUTO: 0.03 THOUSANDS/ΜL (ref 0–0.1)
BASOPHILS NFR BLD AUTO: 0 % (ref 0–1)
BILIRUB SERPL-MCNC: 2.1 MG/DL (ref 0.2–1)
BLD GP AB SCN SERPL QL: NEGATIVE
BUN SERPL-MCNC: 13 MG/DL (ref 5–25)
CALCIUM SERPL-MCNC: 9.2 MG/DL (ref 8.4–10.2)
CHLORIDE SERPL-SCNC: 100 MMOL/L (ref 96–108)
CO2 SERPL-SCNC: 20 MMOL/L (ref 21–32)
CREAT SERPL-MCNC: 0.59 MG/DL (ref 0.6–1.3)
EOSINOPHIL # BLD AUTO: 0.08 THOUSAND/ΜL (ref 0–0.61)
EOSINOPHIL NFR BLD AUTO: 1 % (ref 0–6)
ERYTHROCYTE [DISTWIDTH] IN BLOOD BY AUTOMATED COUNT: 13.7 % (ref 11.6–15.1)
GFR SERPL CREATININE-BSD FRML MDRD: 122 ML/MIN/1.73SQ M
GLUCOSE SERPL-MCNC: 176 MG/DL (ref 65–140)
GLUCOSE SERPL-MCNC: 189 MG/DL (ref 65–140)
HCO3 BLDV-SCNC: 20.2 MMOL/L (ref 24–30)
HCT VFR BLD AUTO: 42.6 % (ref 34.8–46.1)
HGB BLD-MCNC: 14 G/DL (ref 11.5–15.4)
IMM GRANULOCYTES # BLD AUTO: 0.02 THOUSAND/UL (ref 0–0.2)
IMM GRANULOCYTES NFR BLD AUTO: 0 % (ref 0–2)
LIPASE SERPL-CCNC: 9 U/L (ref 11–82)
LYMPHOCYTES # BLD AUTO: 0.72 THOUSANDS/ΜL (ref 0.6–4.47)
LYMPHOCYTES NFR BLD AUTO: 11 % (ref 14–44)
MCH RBC QN AUTO: 28.9 PG (ref 26.8–34.3)
MCHC RBC AUTO-ENTMCNC: 32.9 G/DL (ref 31.4–37.4)
MCV RBC AUTO: 88 FL (ref 82–98)
MONOCYTES # BLD AUTO: 0.43 THOUSAND/ΜL (ref 0.17–1.22)
MONOCYTES NFR BLD AUTO: 6 % (ref 4–12)
NEUTROPHILS # BLD AUTO: 5.44 THOUSANDS/ΜL (ref 1.85–7.62)
NEUTS SEG NFR BLD AUTO: 82 % (ref 43–75)
NRBC BLD AUTO-RTO: 0 /100 WBCS
O2 CT BLDV-SCNC: 13.2 ML/DL
PCO2 BLDV: 33.6 MM HG (ref 42–50)
PH BLDV: 7.4 [PH] (ref 7.3–7.4)
PLATELET # BLD AUTO: 252 THOUSANDS/UL (ref 149–390)
PMV BLD AUTO: 10.5 FL (ref 8.9–12.7)
PO2 BLDV: 38.7 MM HG (ref 35–45)
POTASSIUM SERPL-SCNC: 3.5 MMOL/L (ref 3.5–5.3)
PROT SERPL-MCNC: 7.6 G/DL (ref 6.4–8.4)
RBC # BLD AUTO: 4.85 MILLION/UL (ref 3.81–5.12)
RH BLD: POSITIVE
SODIUM SERPL-SCNC: 133 MMOL/L (ref 135–147)
SPECIMEN EXPIRATION DATE: NORMAL
WBC # BLD AUTO: 6.72 THOUSAND/UL (ref 4.31–10.16)

## 2025-01-09 PROCEDURE — 82010 KETONE BODYS QUAN: CPT | Performed by: EMERGENCY MEDICINE

## 2025-01-09 PROCEDURE — 99285 EMERGENCY DEPT VISIT HI MDM: CPT | Performed by: EMERGENCY MEDICINE

## 2025-01-09 PROCEDURE — 83690 ASSAY OF LIPASE: CPT | Performed by: EMERGENCY MEDICINE

## 2025-01-09 PROCEDURE — 76801 OB US < 14 WKS SINGLE FETUS: CPT

## 2025-01-09 PROCEDURE — 96366 THER/PROPH/DIAG IV INF ADDON: CPT

## 2025-01-09 PROCEDURE — 96375 TX/PRO/DX INJ NEW DRUG ADDON: CPT

## 2025-01-09 PROCEDURE — NC001 PR NO CHARGE: Performed by: OBSTETRICS & GYNECOLOGY

## 2025-01-09 PROCEDURE — 85025 COMPLETE CBC W/AUTO DIFF WBC: CPT | Performed by: EMERGENCY MEDICINE

## 2025-01-09 PROCEDURE — 86900 BLOOD TYPING SEROLOGIC ABO: CPT

## 2025-01-09 PROCEDURE — 86850 RBC ANTIBODY SCREEN: CPT

## 2025-01-09 PROCEDURE — 82805 BLOOD GASES W/O2 SATURATION: CPT | Performed by: EMERGENCY MEDICINE

## 2025-01-09 PROCEDURE — 99283 EMERGENCY DEPT VISIT LOW MDM: CPT

## 2025-01-09 PROCEDURE — 96361 HYDRATE IV INFUSION ADD-ON: CPT

## 2025-01-09 PROCEDURE — 82948 REAGENT STRIP/BLOOD GLUCOSE: CPT

## 2025-01-09 PROCEDURE — 96365 THER/PROPH/DIAG IV INF INIT: CPT

## 2025-01-09 PROCEDURE — 80053 COMPREHEN METABOLIC PANEL: CPT | Performed by: EMERGENCY MEDICINE

## 2025-01-09 PROCEDURE — 36415 COLL VENOUS BLD VENIPUNCTURE: CPT | Performed by: EMERGENCY MEDICINE

## 2025-01-09 PROCEDURE — 86901 BLOOD TYPING SEROLOGIC RH(D): CPT

## 2025-01-09 RX ORDER — ONDANSETRON 4 MG/1
4 TABLET, FILM COATED ORAL EVERY 8 HOURS PRN
Qty: 20 TABLET | Refills: 0 | Status: SHIPPED | OUTPATIENT
Start: 2025-01-09

## 2025-01-09 RX ORDER — SODIUM CHLORIDE, SODIUM GLUCONATE, SODIUM ACETATE, POTASSIUM CHLORIDE, MAGNESIUM CHLORIDE, SODIUM PHOSPHATE, DIBASIC, AND POTASSIUM PHOSPHATE .53; .5; .37; .037; .03; .012; .00082 G/100ML; G/100ML; G/100ML; G/100ML; G/100ML; G/100ML; G/100ML
1000 INJECTION, SOLUTION INTRAVENOUS ONCE
Status: COMPLETED | OUTPATIENT
Start: 2025-01-09 | End: 2025-01-09

## 2025-01-09 RX ORDER — ONDANSETRON 2 MG/ML
4 INJECTION INTRAMUSCULAR; INTRAVENOUS ONCE
Status: COMPLETED | OUTPATIENT
Start: 2025-01-09 | End: 2025-01-09

## 2025-01-09 RX ORDER — SODIUM CHLORIDE, SODIUM LACTATE, POTASSIUM CHLORIDE, CALCIUM CHLORIDE 600; 310; 30; 20 MG/100ML; MG/100ML; MG/100ML; MG/100ML
125 INJECTION, SOLUTION INTRAVENOUS CONTINUOUS
Status: DISCONTINUED | OUTPATIENT
Start: 2025-01-09 | End: 2025-01-09 | Stop reason: HOSPADM

## 2025-01-09 RX ORDER — MISOPROSTOL 200 UG/1
400 TABLET ORAL ONCE
Qty: 2 TABLET | Refills: 0 | Status: SHIPPED | OUTPATIENT
Start: 2025-01-09 | End: 2025-01-09

## 2025-01-09 RX ADMIN — SODIUM CHLORIDE 1000 ML: 0.9 INJECTION, SOLUTION INTRAVENOUS at 08:11

## 2025-01-09 RX ADMIN — SODIUM CHLORIDE, SODIUM LACTATE, POTASSIUM CHLORIDE, AND CALCIUM CHLORIDE 125 ML/HR: .6; .31; .03; .02 INJECTION, SOLUTION INTRAVENOUS at 12:57

## 2025-01-09 RX ADMIN — ONDANSETRON 4 MG: 2 INJECTION INTRAMUSCULAR; INTRAVENOUS at 08:12

## 2025-01-09 RX ADMIN — SODIUM CHLORIDE, SODIUM GLUCONATE, SODIUM ACETATE, POTASSIUM CHLORIDE, MAGNESIUM CHLORIDE, SODIUM PHOSPHATE, DIBASIC, AND POTASSIUM PHOSPHATE 1000 ML: .53; .5; .37; .037; .03; .012; .00082 INJECTION, SOLUTION INTRAVENOUS at 10:46

## 2025-01-09 NOTE — ASSESSMENT & PLAN NOTE
28yo  female presenting to the ED with a 3-day history of nausea/vomiting and clinical signs of dehydration. Improved with IV hydration and completed trial of PO. History significant for T1DM on an insulin pump with grossly normal glycemic control. Bedside TAUS without fetal cardiac activity and formal pelvic imaging confirming diagnosis of missed  measuring approximately 12w6d, suspected GA 13w0d by LMP. Gregoria denies abdominal pain or vaginal bleeding. Appropriately grieving. Patient's , Angel, was present during counseling. We discussed options moving forward including expectant vs. Medical vs. Surgical management. Counseled regarding risk of bleeding given size of fetus and potential incomplete passage of POC at home. Ultimately deciding to proceed with exam under anesthesia, dilation and evacuation, and all other indicated procedures. Counseled regarding risks of bleeding, infection, scarring, uterine perforation, need for blood transfusion, and need for additional procedures including diagnostic laparoscopy in event of uterine perforation or emergent hysterectomy for uncontrolled bleeding. Reviewed hospital and private disposition in detail, undecided at this time. Declines genetic testing or chromosome analysis. Agreeable to blood products if necessary. Agreeable to full cardiac and pulmonary resuscitation, level 1 full code. All questions answered.    Plan:  NPO at midnight  T&S result O positive, Rhogam not indicated  Written and verbal consent obtained for EUA, D&E, and AOIP  Vaginal cytotec 400mcg 2-4 hours prior to procedure  IV Doxycycline 200mg within 1h prior to procedure  PO Tylenol 975mg, PO Celecoxib 200mg prior to procedure per YUE  Procedure scheduled for 0800 tomorrow 1/10 with Dr. Dunbar  Stable for discharge  Strict return precautions for intractable abdominal pain, large passage of clots, heavy vaginal bleeding, or presyncopal symptoms

## 2025-01-09 NOTE — ED PROVIDER NOTES
Time reflects when diagnosis was documented in both MDM as applicable and the Disposition within this note       Time User Action Codes Description Comment    2025 11:38 AM Gualberto Montse Add [O02.1] Fetal demise before 20 weeks with retention of dead fetus     2025  2:29 PM Montse Burciaga Add [R11.2] Nausea and vomiting     2025  2:33 PM Luc Billings Add [O02.1] Missed  with fetal demise before 20 completed weeks of gestation           ED Disposition       ED Disposition   Discharge    Condition   Stable    Date/Time   u 2025  2:26 PM    Comment   Gregoria Martin discharge to home/self care.                   Assessment & Plan       Medical Decision Making  Amount and/or Complexity of Data Reviewed  Labs: ordered. Decision-making details documented in ED Course.  Radiology: ordered.    Risk  Prescription drug management.      Patient is tachycardic, appears clinically dehydrated with dry lips and oral mucous membranes.  Will give IV fluids.  DKA is considered, obtain blood work.  Give antiemetic.  Obtain ultrasound.    ED Course as of 25 1540   Thu 2025   0954 ANION GAP: 13   0954 BG <250   1054 pH, Yrn: 7.396     Patient is not acidotic, blood glucose not elevated above 250, anion gap reassuring, not in DKA.  Nausea improved with IV antiemetic, patient being hydrated.  Obtained ultrasound, unfortunately appears to show lack of fetal movement and fetal heart rate.  Discussed with OB/GYN, Dr. Billings, they will evaluate patient at bedside.    Patient scheduled for D&E with OB/GYN tomorrow morning at 8 AM.  She is able to tolerate p.o.  She did urinate while in the emergency department.  She is feeling much better upon reevaluation.  She was updated with all results, and she understands the plan for D&E tomorrow morning.   now at bedside as well.  I discussed strict return precautions.  Patient verbalized understanding and is in agreement.    Medications   lactated ringers  infusion (0 mL/hr Intravenous Stopped 1/9/25 1456)   sodium chloride 0.9 % bolus 1,000 mL (0 mL Intravenous Stopped 1/9/25 1048)   ondansetron (ZOFRAN) injection 4 mg (4 mg Intravenous Given 1/9/25 0812)   multi-electrolyte (ISOLYTE-S PH 7.4) bolus 1,000 mL (0 mL Intravenous Stopped 1/9/25 1250)       ED Risk Strat Scores                          SBIRT 22yo+      Flowsheet Row Most Recent Value   Initial Alcohol Screen: US AUDIT-C     1. How often do you have a drink containing alcohol? 0 Filed at: 01/09/2025 0753   2. How many drinks containing alcohol do you have on a typical day you are drinking?  0 Filed at: 01/09/2025 0753   3b. FEMALE Any Age, or MALE 65+: How often do you have 4 or more drinks on one occassion? 0 Filed at: 01/09/2025 0753   Audit-C Score 0 Filed at: 01/09/2025 0753   BRIDGETTE: How many times in the past year have you...    Used an illegal drug or used a prescription medication for non-medical reasons? Never Filed at: 01/09/2025 0753                            History of Present Illness       Chief Complaint   Patient presents with    Vomiting     Pt c/o n/v x2 days. CLAU. States she has not peed since Tuesday. 13w pregnant Denies abd pain/CP/SOB       Past Medical History:   Diagnosis Date    Asthma     age 3 - hospitalized age 7 - no inhaler use since 2000    Diabetes mellitus (HCC)     Type 1 - diagnosed age 8 - insulin pump since 2011    Varicella     pt had chickenpox in childhood      Past Surgical History:   Procedure Laterality Date    WISDOM TOOTH EXTRACTION        Family History   Problem Relation Age of Onset    No Known Problems Mother     Thyroid disease Father         hypo    No Known Problems Maternal Grandmother     Heart attack Maternal Grandfather         age 55    No Known Problems Paternal Grandmother     Hypertension Paternal Grandfather     Diabetes Paternal Grandfather         type 2      Social History     Tobacco Use    Smoking status: Never    Smokeless tobacco: Never    Vaping Use    Vaping status: Never Used   Substance Use Topics    Alcohol use: Not Currently    Drug use: Never      E-Cigarette/Vaping    E-Cigarette Use Never User       E-Cigarette/Vaping Substances      I have reviewed and agree with the history as documented.     29-year-old female presenting to the emergency department with nausea and vomiting for the last 2 to 3 days.  States she has not urinated since Tuesday.  , she is 13 weeks pregnant.  Denies any lower abdominal pain or abdominal pain in general, no blood in her vomit, no diarrhea, no vaginal bleeding.  She is a type I diabetic, states she has been eating less and therefore has been using less insulin.  Denies ever being in DKA.        Review of Systems   Respiratory:  Negative for cough and shortness of breath.    Cardiovascular:  Negative for chest pain and palpitations.   Gastrointestinal:  Positive for nausea and vomiting. Negative for abdominal pain, blood in stool and diarrhea.   Genitourinary:  Positive for decreased urine volume. Negative for flank pain, vaginal bleeding, vaginal discharge and vaginal pain.   Musculoskeletal:  Negative for back pain.           Objective       ED Triage Vitals   Temperature Pulse Blood Pressure Respirations SpO2 Patient Position - Orthostatic VS   25 0805 25 0754 25 0754 25 0754 25 0754 25 0754   98.7 °F (37.1 °C) (!) 125 147/79 18 100 % Sitting      Temp src Heart Rate Source BP Location FiO2 (%) Pain Score    -- 25 0754 25 0754 -- 25 0754     Monitor Right arm  No Pain      Vitals      Date and Time Temp Pulse SpO2 Resp BP Pain Score FACES Pain Rating User   25 1330 -- 96 99 % 18 107/66 -- -- LA   25 1230 -- 102 100 % 18 121/73 -- -- LS   25 1200 -- 89 100 % 18 101/66 -- -- LS   25 1100 -- 82 100 % 18 102/69 -- -- LS   25 1030 -- 84 100 % 18 102/66 -- -- LS   25 0805 98.7 °F (37.1 °C) -- -- -- -- -- -- LS    01/09/25 0754 -- 125 100 % 18 147/79 No Pain -- LS            Physical Exam  Constitutional:       General: She is not in acute distress.  HENT:      Head: Normocephalic and atraumatic.      Nose: Nose normal.      Mouth/Throat:      Mouth: Mucous membranes are dry.   Eyes:      Conjunctiva/sclera: Conjunctivae normal.      Pupils: Pupils are equal, round, and reactive to light.   Cardiovascular:      Rate and Rhythm: Regular rhythm. Tachycardia present.   Pulmonary:      Effort: Pulmonary effort is normal. No respiratory distress.   Abdominal:      General: There is no distension.      Palpations: Abdomen is soft.      Tenderness: There is no abdominal tenderness. There is no guarding or rebound.   Musculoskeletal:         General: Normal range of motion.      Cervical back: Normal range of motion. No rigidity.   Skin:     General: Skin is warm.      Findings: No erythema.   Neurological:      Mental Status: She is alert and oriented to person, place, and time. Mental status is at baseline.         Results Reviewed       Procedure Component Value Units Date/Time    Blood gas, venous [[id number]]  (Abnormal) Collected: 01/09/25 1046    Lab Status: Final result Specimen: Blood from Arm, Right Updated: 01/09/25 1053     pH, Yrn 7.396     pCO2, Yrn 33.6 mm Hg      pO2, Yrn 38.7 mm Hg      HCO3, Yrn 20.2 mmol/L      Base Excess, Yrn -3.9 mmol/L      O2 Content, Yrn 13.2 ml/dL      O2 HGB, VENOUS 73.1 %     Beta Hydroxybutyrate [[id number]]  (Abnormal) Collected: 01/09/25 0811    Lab Status: Final result Specimen: Blood from Arm, Right Updated: 01/09/25 0941     Beta- Hydroxybutyrate 1.36 mmol/L     Comprehensive metabolic panel [[id number]]  (Abnormal) Collected: 01/09/25 0811    Lab Status: Final result Specimen: Blood from Arm, Right Updated: 01/09/25 0833     Sodium 133 mmol/L      Potassium 3.5 mmol/L      Chloride 100 mmol/L      CO2 20 mmol/L      ANION GAP 13 mmol/L      BUN 13 mg/dL      Creatinine 0.59 mg/dL       Glucose 189 mg/dL      Calcium 9.2 mg/dL      AST 15 U/L      ALT 24 U/L      Alkaline Phosphatase 62 U/L      Total Protein 7.6 g/dL      Albumin 4.3 g/dL      Total Bilirubin 2.10 mg/dL      eGFR 122 ml/min/1.73sq m     Narrative:      National Kidney Disease Foundation guidelines for Chronic Kidney Disease (CKD):     Stage 1 with normal or high GFR (GFR > 90 mL/min/1.73 square meters)    Stage 2 Mild CKD (GFR = 60-89 mL/min/1.73 square meters)    Stage 3A Moderate CKD (GFR = 45-59 mL/min/1.73 square meters)    Stage 3B Moderate CKD (GFR = 30-44 mL/min/1.73 square meters)    Stage 4 Severe CKD (GFR = 15-29 mL/min/1.73 square meters)    Stage 5 End Stage CKD (GFR <15 mL/min/1.73 square meters)  Note: GFR calculation is accurate only with a steady state creatinine    Lipase [[id number]]  (Abnormal) Collected: 01/09/25 0811    Lab Status: Final result Specimen: Blood from Arm, Right Updated: 01/09/25 0833     Lipase 9 u/L     CBC and differential [[id number]]  (Abnormal) Collected: 01/09/25 0811    Lab Status: Final result Specimen: Blood from Arm, Right Updated: 01/09/25 0818     WBC 6.72 Thousand/uL      RBC 4.85 Million/uL      Hemoglobin 14.0 g/dL      Hematocrit 42.6 %      MCV 88 fL      MCH 28.9 pg      MCHC 32.9 g/dL      RDW 13.7 %      MPV 10.5 fL      Platelets 252 Thousands/uL      nRBC 0 /100 WBCs      Segmented % 82 %      Immature Grans % 0 %      Lymphocytes % 11 %      Monocytes % 6 %      Eosinophils Relative 1 %      Basophils Relative 0 %      Absolute Neutrophils 5.44 Thousands/µL      Absolute Immature Grans 0.02 Thousand/uL      Absolute Lymphocytes 0.72 Thousands/µL      Absolute Monocytes 0.43 Thousand/µL      Eosinophils Absolute 0.08 Thousand/µL      Basophils Absolute 0.03 Thousands/µL     UA w Reflex to Microscopic w Reflex to Culture [[id number]]     Lab Status: No result Specimen: Urine     Fingerstick Glucose (POCT) [[id number]]  (Abnormal) Collected: 01/09/25 0801    Lab Status: Final  "result Specimen: Blood Updated: 01/09/25 0806     POC Glucose 176 mg/dl             US OB < 14 weeks single or first gestation level 1   Final Interpretation by Ignacio Ramos MD (01/09 1114)      Findings diagnostic of pregnancy loss. There is a 12 weeks and 6-day old fetus with absence of cardiac activity or fetal movement.            I personally discussed this study with RAMON ALAN on 1/9/2025 11:14 AM.            Workstation performed: [id number]             Procedures    ED Medication and Procedure Management   Prior to Admission Medications   Prescriptions Last Dose Informant Patient Reported? Taking?   Collagen-Vitamin C-Biotin (COLLAGEN PO)  Self Yes No   Sig: Take by mouth 1 scoop daily   Ferrous Sulfate (IRON PO)  Self Yes No   Sig: Take 1 tablet by mouth daily   Gvoke Kit 1 MG/0.2ML SOLN   Yes No   Sig: INJECT 1MG UNDER THE SKIN AS NEEDED FOR SEVERE HYPOGLYCEMIA   MAGNESIUM PO  Self Yes No   Sig: Take 1 tablet by mouth \"Magnesium breakthrough\"   Prenat w/o Z-DY-Ghnrvws-FA-DHA (PNV-DHA) 27-0.6-0.4-300 MG CAPS   Yes No   Sig: Take by mouth   Sodium Caprylate POWD   Yes No   Sig: Take by mouth daily   Patient not taking: Reported on 12/24/2024   insulin lispro (HumaLOG) 100 units/mL injection   Yes No   Sig: Inject under the skin daily as needed for high blood sugar Patient has insulin pump, uses humalog PRN based on BS   Patient taking differently: Inject under the skin daily as needed for high blood sugar (takes 40 units usually) Patient has insulin pump, uses humalog PRN based on BS      Facility-Administered Medications: None     Discharge Medication List as of 1/9/2025  2:44 PM        START taking these medications    Details   miSOPROStol (Cytotec) 200 mcg tablet Take 2 tablets (400 mcg total) by mouth 1 (one) time for 1 dose Place two pills (400 mcg total) in the back of the vagina 2-4 hours before scheduled procedure, Starting Thu 1/9/2025, Normal      ondansetron (ZOFRAN) 4 mg tablet Take 1 " "tablet (4 mg total) by mouth every 8 (eight) hours as needed for nausea or vomiting, Starting Thu 2025, Normal           CONTINUE these medications which have NOT CHANGED    Details   Collagen-Vitamin C-Biotin (COLLAGEN PO) Take by mouth 1 scoop daily, Historical Med      Ferrous Sulfate (IRON PO) Take 1 tablet by mouth daily, Historical Med      Gvoke Kit 1 MG/0.2ML SOLN INJECT 1MG UNDER THE SKIN AS NEEDED FOR SEVERE HYPOGLYCEMIA, Historical Med      insulin lispro (HumaLOG) 100 units/mL injection Inject under the skin daily as needed for high blood sugar Patient has insulin pump, uses humalog PRN based on BS, Historical Med      MAGNESIUM PO Take 1 tablet by mouth \"Magnesium breakthrough\", Historical Med      Prenat w/o B-SU-Sgeptpw-FA-DHA (PNV-DHA) 27-0.6-0.4-300 MG CAPS Take by mouth, Historical Med      Sodium Caprylate POWD Take by mouth daily, Historical Med           No discharge procedures on file.  ED SEPSIS DOCUMENTATION   Time reflects when diagnosis was documented in both MDM as applicable and the Disposition within this note       Time User Action Codes Description Comment    2025 11:38 AM Montse Burciaga Add [O02.1] Fetal demise before 20 weeks with retention of dead fetus     2025  2:29 PM Montse Burciaga Add [R11.2] Nausea and vomiting     2025  2:33 PM Luc Billings Add [O02.1] Missed  with fetal demise before 20 completed weeks of gestation                  Montse Burciaga DO  25 1540    "

## 2025-01-09 NOTE — DISCHARGE INSTRUCTIONS
Please follow-up with OB/GYN as scheduled for operating room tomorrow morning at 8 AM.    Return to the emergency department for any new or worsening symptoms.

## 2025-01-09 NOTE — H&P (VIEW-ONLY)
CONSULTATION - OB/GYN  Gregoria Martin 29 y.o. female MRN: [id number]  Unit/Bed#: ED-04 Encounter: [id number]    Assessment & Plan  Missed  with fetal demise before 20 completed weeks of gestation  28yo  female presenting to the ED with a 3-day history of nausea/vomiting and clinical signs of dehydration. Improved with IV hydration and completed trial of PO. History significant for T1DM on an insulin pump with grossly normal glycemic control. Bedside TAUS without fetal cardiac activity and formal pelvic imaging confirming diagnosis of missed  measuring approximately 12w6d, suspected GA 13w0d by LMP. Gregoria denies abdominal pain or vaginal bleeding. Appropriately grieving. Patient's , Angel, was present during counseling. We discussed options moving forward including expectant vs. Medical vs. Surgical management. Counseled regarding risk of bleeding given size of fetus and potential incomplete passage of POC at home. Ultimately deciding to proceed with exam under anesthesia, dilation and evacuation, and all other indicated procedures. Counseled regarding risks of bleeding, infection, scarring, uterine perforation, need for blood transfusion, and need for additional procedures including diagnostic laparoscopy in event of uterine perforation or emergent hysterectomy for uncontrolled bleeding. Reviewed hospital and private disposition in detail, undecided at this time. Declines genetic testing or chromosome analysis. Agreeable to blood products if necessary. Agreeable to full cardiac and pulmonary resuscitation, level 1 full code. All questions answered.    Plan:  NPO at midnight  T&S result O positive, Rhogam not indicated  Written and verbal consent obtained for EUA, D&E, and AOIP  Vaginal cytotec 400mcg 2-4 hours prior to procedure  IV Doxycycline 200mg within 1h prior to procedure  PO Tylenol 975mg, PO Celecoxib 200mg prior to procedure per ERAS  Procedure scheduled for 0800 tomorrow  1/10 with Dr. Dunbar  Stable for discharge  Strict return precautions for intractable abdominal pain, large passage of clots, heavy vaginal bleeding, or presyncopal symptoms        Subjective:   Contractions: no  Loss of fluid: no  Vaginal bleeding: no  Fetal movement: no    Pain: no  Tolerating PO: yes  Voiding: yes  Ambulating: yes  Headaches: no  Visual changes: no  Chest pain: no  Shortness of breath: no  Nausea: yes  Vomiting/Diarrhea: yes, vomiting  Dysuria: no  Leg pain: no      Review of Systems   Constitutional:  Negative for chills and fever.   HENT:  Negative for congestion, sinus pressure and sinus pain.    Eyes:  Negative for visual disturbance.   Respiratory:  Negative for cough, shortness of breath and wheezing.    Cardiovascular:  Negative for chest pain, palpitations and leg swelling.   Gastrointestinal:  Negative for abdominal pain, constipation, diarrhea, nausea and vomiting.   Genitourinary:  Negative for dysuria, vaginal bleeding and vaginal discharge.   Skin:  Negative for color change, pallor and rash.   Neurological:  Negative for weakness and light-headedness.   Psychiatric/Behavioral:  Negative for agitation and behavioral problems.        Historical Information   Past Medical History:   Diagnosis Date    Asthma     age 3 - hospitalized age 7 - no inhaler use since     Diabetes mellitus (HCC)     Type 1 - diagnosed age 8 - insulin pump since     Varicella     pt had chickenpox in childhood     Past Surgical History:   Procedure Laterality Date    WISDOM TOOTH EXTRACTION       OB History    Para Term  AB Living   5 3 3  1 3   SAB IAB Ectopic Multiple Live Births   1    3      # Outcome Date GA Lbr Estuardo/2nd Weight Sex Type Anes PTL Lv   5 Current            4 Term 22 37w0d  3680 g (8 lb 1.8 oz) F Vag-Spont EPI N BEN   3 Term 19 37w0d  3856 g (8 lb 8 oz) F Vag-Spont EPI N BEN   2 SAB  3w0d          1 Term 17 38w0d  3771 g (8 lb 5 oz) M Vag-Spont  EPI N BEN     Family History   Problem Relation Age of Onset    No Known Problems Mother     Thyroid disease Father         hypo    No Known Problems Maternal Grandmother     Heart attack Maternal Grandfather         age 55    No Known Problems Paternal Grandmother     Hypertension Paternal Grandfather     Diabetes Paternal Grandfather         type 2     Social History   Social History     Substance and Sexual Activity   Alcohol Use Not Currently     Social History     Substance and Sexual Activity   Drug Use Never     Social History     Tobacco Use   Smoking Status Never   Smokeless Tobacco Never       Meds/Allergies   No current facility-administered medications for this encounter.    No Known Allergies      OBJECTIVE:    Vitals: Blood pressure 121/73, pulse 102, temperature 98.7 °F (37.1 °C), resp. rate 18, last menstrual period 09/05/2024, SpO2 100%. There is no height or weight on file to calculate BMI.    Physical Exam  Vitals and nursing note reviewed. Exam conducted with a chaperone present.   Constitutional:       General: She is not in acute distress.  HENT:      Head: Normocephalic.      Right Ear: External ear normal.      Left Ear: External ear normal.   Eyes:      General: No scleral icterus.        Right eye: No discharge.         Left eye: No discharge.      Conjunctiva/sclera: Conjunctivae normal.   Cardiovascular:      Rate and Rhythm: Regular rhythm. Tachycardia present.      Pulses: Normal pulses.      Heart sounds: Normal heart sounds.   Pulmonary:      Effort: Pulmonary effort is normal.      Breath sounds: Normal breath sounds.   Abdominal:      General: There is no distension.      Palpations: Abdomen is soft.      Tenderness: There is no abdominal tenderness. There is no guarding.   Musculoskeletal:         General: No swelling or tenderness. Normal range of motion.      Cervical back: Normal range of motion.      Right lower leg: No edema.      Left lower leg: No edema.   Skin:     General:  Skin is warm and dry.      Capillary Refill: Capillary refill takes less than 2 seconds.   Neurological:      Mental Status: She is alert and oriented to person, place, and time. Mental status is at baseline.   Psychiatric:         Mood and Affect: Mood normal.         Behavior: Behavior normal.           Lab Results:   Recent Results (from the past 24 hours)   Fingerstick Glucose (POCT)    Collection Time: 01/09/25  8:01 AM   Result Value Ref Range    POC Glucose 176 (H) 65 - 140 mg/dl   CBC and differential    Collection Time: 01/09/25  8:11 AM   Result Value Ref Range    WBC 6.72 4.31 - 10.16 Thousand/uL    RBC 4.85 3.81 - 5.12 Million/uL    Hemoglobin 14.0 11.5 - 15.4 g/dL    Hematocrit 42.6 34.8 - 46.1 %    MCV 88 82 - 98 fL    MCH 28.9 26.8 - 34.3 pg    MCHC 32.9 31.4 - 37.4 g/dL    RDW 13.7 11.6 - 15.1 %    MPV 10.5 8.9 - 12.7 fL    Platelets 252 149 - 390 Thousands/uL    nRBC 0 /100 WBCs    Segmented % 82 (H) 43 - 75 %    Immature Grans % 0 0 - 2 %    Lymphocytes % 11 (L) 14 - 44 %    Monocytes % 6 4 - 12 %    Eosinophils Relative 1 0 - 6 %    Basophils Relative 0 0 - 1 %    Absolute Neutrophils 5.44 1.85 - 7.62 Thousands/µL    Absolute Immature Grans 0.02 0.00 - 0.20 Thousand/uL    Absolute Lymphocytes 0.72 0.60 - 4.47 Thousands/µL    Absolute Monocytes 0.43 0.17 - 1.22 Thousand/µL    Eosinophils Absolute 0.08 0.00 - 0.61 Thousand/µL    Basophils Absolute 0.03 0.00 - 0.10 Thousands/µL   Comprehensive metabolic panel    Collection Time: 01/09/25  8:11 AM   Result Value Ref Range    Sodium 133 (L) 135 - 147 mmol/L    Potassium 3.5 3.5 - 5.3 mmol/L    Chloride 100 96 - 108 mmol/L    CO2 20 (L) 21 - 32 mmol/L    ANION GAP 13 4 - 13 mmol/L    BUN 13 5 - 25 mg/dL    Creatinine 0.59 (L) 0.60 - 1.30 mg/dL    Glucose 189 (H) 65 - 140 mg/dL    Calcium 9.2 8.4 - 10.2 mg/dL    AST 15 13 - 39 U/L    ALT 24 7 - 52 U/L    Alkaline Phosphatase 62 34 - 104 U/L    Total Protein 7.6 6.4 - 8.4 g/dL    Albumin 4.3 3.5 - 5.0  g/dL    Total Bilirubin 2.10 (H) 0.20 - 1.00 mg/dL    eGFR 122 ml/min/1.73sq m   Lipase    Collection Time: 25  8:11 AM   Result Value Ref Range    Lipase 9 (L) 11 - 82 u/L   Beta Hydroxybutyrate    Collection Time: 25  8:11 AM   Result Value Ref Range    Beta- Hydroxybutyrate 1.36 (H) 0.02 - 0.27 mmol/L   Blood gas, venous    Collection Time: 25 10:46 AM   Result Value Ref Range    pH, Yrn 7.396 7.300 - 7.400    pCO2, Yrn 33.6 (L) 42.0 - 50.0 mm Hg    pO2, Yrn 38.7 35.0 - 45.0 mm Hg    HCO3, Yrn 20.2 (L) 24 - 30 mmol/L    Base Excess, Yrn -3.9 mmol/L    O2 Content, Yrn 13.2 ml/dL    O2 HGB, VENOUS 73.1 60.0 - 80.0 %       Imaging Studies: Results Review Statement: I personally reviewed the following image studies/reports in PACS and discussed pertinent findings with Radiology: Ultrasound(s). My interpretation of the radiology images/reports is: missed .    EKG, Pathology, and Other Studies: Results Review Statement: No pertinent imaging studies reviewed.      Luc Billings MD  OB/GYN PGY-4  2025  12:44 PM

## 2025-01-09 NOTE — TELEPHONE ENCOUNTER
----- Message from Lakeisha Lopez MD sent at 2025  1:08 PM EST -----  Regarding: Schedule D&E 1/10/25  [facility] GYN Department  Surgery Scheduling Sheet    Patient Name: Gregoria Martin  : 1995    Provider: Lakeisha Lopez MD     Needed: no; Language: N/A    Procedure: exam under anesthesia, all other indicated procedures, and dilation and evacuation    Diagnosis: Missed  measuring 12w6d    Special Needs or Equipment: Ultrasound     Anesthesia: choice    Length of stay: outpatient  Does patient have comorbid conditions that will require close perioperative monitoring prior to safe discharge: Yes    The patient has comorbid conditions that will require close perioperative monitoring prior to safe discharge, including Type 1 diabetes  This may require acute care beyond the usual and routine recovery period. As such, inpatient admission post-operatively is expected and appropriate, and anticipated hospital length of stay will be >2 midnights.    Pre-Admission Testing Needed: no   Labs that should be ordered: NA               Labs obtained in the [facility] ED today (T&S has been ordered but patient is O positive from Care Everywhere)    Order PAT that is recommended in prep for procedure?: Not Indicated    Medical Clearance Needed: no; Provider: N/A    MA Form Signed (tubals/hysterectomy): Not Indicated    Surgical Drink Given: no     How many days out of work: Up to 2 weeks (emotional recovery)  How many days no driving: Until able to do everything that driving requires and no longer taking narcotic medications     Is pre op appt needed?  no  Interval for post op appt: 2 week(s)     Note:   Patient is currently in the [facility] ED. Planning D&E for tomorrow 1/10/25. Patient undecided on genetics or disposition but consents (including surgical e-consent) will hopefully be signed prior to discharge from the ED in preparation for tomorrow. If necessary, consents can be signed tomorrow by  on-call physician (Bettina).     For Surgical Scheduler:     Surgery Scheduled On:  [place]: [facility] or main     Pre-op Appt:   Post op Appt:  Consult/Medical clearance appt:

## 2025-01-09 NOTE — CONSULTS
CONSULTATION - OB/GYN  Gregoria aMrtin 29 y.o. female MRN: [id number]  Unit/Bed#: ED-04 Encounter: [id number]    Assessment & Plan  Missed  with fetal demise before 20 completed weeks of gestation  28yo  female presenting to the ED with a 3-day history of nausea/vomiting and clinical signs of dehydration. Improved with IV hydration and completed trial of PO. History significant for T1DM on an insulin pump with grossly normal glycemic control. Bedside TAUS without fetal cardiac activity and formal pelvic imaging confirming diagnosis of missed  measuring approximately 12w6d, suspected GA 13w0d by LMP. Gregoria denies abdominal pain or vaginal bleeding. Appropriately grieving. Patient's , Angel, was present during counseling. We discussed options moving forward including expectant vs. Medical vs. Surgical management. Counseled regarding risk of bleeding given size of fetus and potential incomplete passage of POC at home. Ultimately deciding to proceed with exam under anesthesia, dilation and evacuation, and all other indicated procedures. Counseled regarding risks of bleeding, infection, scarring, uterine perforation, need for blood transfusion, and need for additional procedures including diagnostic laparoscopy in event of uterine perforation or emergent hysterectomy for uncontrolled bleeding. Reviewed hospital and private disposition in detail, undecided at this time. Declines genetic testing or chromosome analysis. Agreeable to blood products if necessary. Agreeable to full cardiac and pulmonary resuscitation, level 1 full code. All questions answered.    Plan:  NPO at midnight  T&S result O positive, Rhogam not indicated  Written and verbal consent obtained for EUA, D&E, and AOIP  Vaginal cytotec 400mcg 2-4 hours prior to procedure  IV Doxycycline 200mg within 1h prior to procedure  PO Tylenol 975mg, PO Celecoxib 200mg prior to procedure per ERAS  Procedure scheduled for 0800 tomorrow  1/10 with Dr. Dunbar  Stable for discharge  Strict return precautions for intractable abdominal pain, large passage of clots, heavy vaginal bleeding, or presyncopal symptoms        Subjective:   Contractions: no  Loss of fluid: no  Vaginal bleeding: no  Fetal movement: no    Pain: no  Tolerating PO: yes  Voiding: yes  Ambulating: yes  Headaches: no  Visual changes: no  Chest pain: no  Shortness of breath: no  Nausea: yes  Vomiting/Diarrhea: yes, vomiting  Dysuria: no  Leg pain: no      Review of Systems   Constitutional:  Negative for chills and fever.   HENT:  Negative for congestion, sinus pressure and sinus pain.    Eyes:  Negative for visual disturbance.   Respiratory:  Negative for cough, shortness of breath and wheezing.    Cardiovascular:  Negative for chest pain, palpitations and leg swelling.   Gastrointestinal:  Negative for abdominal pain, constipation, diarrhea, nausea and vomiting.   Genitourinary:  Negative for dysuria, vaginal bleeding and vaginal discharge.   Skin:  Negative for color change, pallor and rash.   Neurological:  Negative for weakness and light-headedness.   Psychiatric/Behavioral:  Negative for agitation and behavioral problems.        Historical Information   Past Medical History:   Diagnosis Date    Asthma     age 3 - hospitalized age 7 - no inhaler use since     Diabetes mellitus (HCC)     Type 1 - diagnosed age 8 - insulin pump since     Varicella     pt had chickenpox in childhood     Past Surgical History:   Procedure Laterality Date    WISDOM TOOTH EXTRACTION       OB History    Para Term  AB Living   5 3 3  1 3   SAB IAB Ectopic Multiple Live Births   1    3      # Outcome Date GA Lbr Estuardo/2nd Weight Sex Type Anes PTL Lv   5 Current            4 Term 22 37w0d  3680 g (8 lb 1.8 oz) F Vag-Spont EPI N BEN   3 Term 19 37w0d  3856 g (8 lb 8 oz) F Vag-Spont EPI N BEN   2 SAB  3w0d          1 Term 17 38w0d  3771 g (8 lb 5 oz) M Vag-Spont  EPI N BEN     Family History   Problem Relation Age of Onset    No Known Problems Mother     Thyroid disease Father         hypo    No Known Problems Maternal Grandmother     Heart attack Maternal Grandfather         age 55    No Known Problems Paternal Grandmother     Hypertension Paternal Grandfather     Diabetes Paternal Grandfather         type 2     Social History   Social History     Substance and Sexual Activity   Alcohol Use Not Currently     Social History     Substance and Sexual Activity   Drug Use Never     Social History     Tobacco Use   Smoking Status Never   Smokeless Tobacco Never       Meds/Allergies   No current facility-administered medications for this encounter.    No Known Allergies      OBJECTIVE:    Vitals: Blood pressure 121/73, pulse 102, temperature 98.7 °F (37.1 °C), resp. rate 18, last menstrual period 09/05/2024, SpO2 100%. There is no height or weight on file to calculate BMI.    Physical Exam  Vitals and nursing note reviewed. Exam conducted with a chaperone present.   Constitutional:       General: She is not in acute distress.  HENT:      Head: Normocephalic.      Right Ear: External ear normal.      Left Ear: External ear normal.   Eyes:      General: No scleral icterus.        Right eye: No discharge.         Left eye: No discharge.      Conjunctiva/sclera: Conjunctivae normal.   Cardiovascular:      Rate and Rhythm: Regular rhythm. Tachycardia present.      Pulses: Normal pulses.      Heart sounds: Normal heart sounds.   Pulmonary:      Effort: Pulmonary effort is normal.      Breath sounds: Normal breath sounds.   Abdominal:      General: There is no distension.      Palpations: Abdomen is soft.      Tenderness: There is no abdominal tenderness. There is no guarding.   Musculoskeletal:         General: No swelling or tenderness. Normal range of motion.      Cervical back: Normal range of motion.      Right lower leg: No edema.      Left lower leg: No edema.   Skin:     General:  Skin is warm and dry.      Capillary Refill: Capillary refill takes less than 2 seconds.   Neurological:      Mental Status: She is alert and oriented to person, place, and time. Mental status is at baseline.   Psychiatric:         Mood and Affect: Mood normal.         Behavior: Behavior normal.           Lab Results:   Recent Results (from the past 24 hours)   Fingerstick Glucose (POCT)    Collection Time: 01/09/25  8:01 AM   Result Value Ref Range    POC Glucose 176 (H) 65 - 140 mg/dl   CBC and differential    Collection Time: 01/09/25  8:11 AM   Result Value Ref Range    WBC 6.72 4.31 - 10.16 Thousand/uL    RBC 4.85 3.81 - 5.12 Million/uL    Hemoglobin 14.0 11.5 - 15.4 g/dL    Hematocrit 42.6 34.8 - 46.1 %    MCV 88 82 - 98 fL    MCH 28.9 26.8 - 34.3 pg    MCHC 32.9 31.4 - 37.4 g/dL    RDW 13.7 11.6 - 15.1 %    MPV 10.5 8.9 - 12.7 fL    Platelets 252 149 - 390 Thousands/uL    nRBC 0 /100 WBCs    Segmented % 82 (H) 43 - 75 %    Immature Grans % 0 0 - 2 %    Lymphocytes % 11 (L) 14 - 44 %    Monocytes % 6 4 - 12 %    Eosinophils Relative 1 0 - 6 %    Basophils Relative 0 0 - 1 %    Absolute Neutrophils 5.44 1.85 - 7.62 Thousands/µL    Absolute Immature Grans 0.02 0.00 - 0.20 Thousand/uL    Absolute Lymphocytes 0.72 0.60 - 4.47 Thousands/µL    Absolute Monocytes 0.43 0.17 - 1.22 Thousand/µL    Eosinophils Absolute 0.08 0.00 - 0.61 Thousand/µL    Basophils Absolute 0.03 0.00 - 0.10 Thousands/µL   Comprehensive metabolic panel    Collection Time: 01/09/25  8:11 AM   Result Value Ref Range    Sodium 133 (L) 135 - 147 mmol/L    Potassium 3.5 3.5 - 5.3 mmol/L    Chloride 100 96 - 108 mmol/L    CO2 20 (L) 21 - 32 mmol/L    ANION GAP 13 4 - 13 mmol/L    BUN 13 5 - 25 mg/dL    Creatinine 0.59 (L) 0.60 - 1.30 mg/dL    Glucose 189 (H) 65 - 140 mg/dL    Calcium 9.2 8.4 - 10.2 mg/dL    AST 15 13 - 39 U/L    ALT 24 7 - 52 U/L    Alkaline Phosphatase 62 34 - 104 U/L    Total Protein 7.6 6.4 - 8.4 g/dL    Albumin 4.3 3.5 - 5.0  g/dL    Total Bilirubin 2.10 (H) 0.20 - 1.00 mg/dL    eGFR 122 ml/min/1.73sq m   Lipase    Collection Time: 25  8:11 AM   Result Value Ref Range    Lipase 9 (L) 11 - 82 u/L   Beta Hydroxybutyrate    Collection Time: 25  8:11 AM   Result Value Ref Range    Beta- Hydroxybutyrate 1.36 (H) 0.02 - 0.27 mmol/L   Blood gas, venous    Collection Time: 25 10:46 AM   Result Value Ref Range    pH, Yrn 7.396 7.300 - 7.400    pCO2, Yrn 33.6 (L) 42.0 - 50.0 mm Hg    pO2, Yrn 38.7 35.0 - 45.0 mm Hg    HCO3, Yrn 20.2 (L) 24 - 30 mmol/L    Base Excess, Yrn -3.9 mmol/L    O2 Content, Yrn 13.2 ml/dL    O2 HGB, VENOUS 73.1 60.0 - 80.0 %       Imaging Studies: Results Review Statement: I personally reviewed the following image studies/reports in PACS and discussed pertinent findings with Radiology: Ultrasound(s). My interpretation of the radiology images/reports is: missed .    EKG, Pathology, and Other Studies: Results Review Statement: No pertinent imaging studies reviewed.      Luc Billings MD  OB/GYN PGY-4  2025  12:44 PM

## 2025-01-10 ENCOUNTER — ANESTHESIA EVENT (OUTPATIENT)
Dept: PERIOP | Facility: HOSPITAL | Age: 30
End: 2025-01-10
Payer: COMMERCIAL

## 2025-01-10 ENCOUNTER — HOSPITAL ENCOUNTER (OUTPATIENT)
Facility: HOSPITAL | Age: 30
Setting detail: OUTPATIENT SURGERY
Discharge: HOME | End: 2025-01-10
Attending: STUDENT IN AN ORGANIZED HEALTH CARE EDUCATION/TRAINING PROGRAM | Admitting: STUDENT IN AN ORGANIZED HEALTH CARE EDUCATION/TRAINING PROGRAM
Payer: COMMERCIAL

## 2025-01-10 ENCOUNTER — ANESTHESIA (OUTPATIENT)
Dept: PERIOP | Facility: HOSPITAL | Age: 30
End: 2025-01-10
Payer: COMMERCIAL

## 2025-01-10 VITALS
HEART RATE: 72 BPM | DIASTOLIC BLOOD PRESSURE: 54 MMHG | SYSTOLIC BLOOD PRESSURE: 103 MMHG | OXYGEN SATURATION: 98 % | RESPIRATION RATE: 20 BRPM | HEIGHT: 64 IN | BODY MASS INDEX: 26.46 KG/M2 | TEMPERATURE: 97 F | WEIGHT: 155 LBS

## 2025-01-10 DIAGNOSIS — O02.1 MISSED AB: ICD-10-CM

## 2025-01-10 PROBLEM — J45.909 ASTHMA: Status: ACTIVE | Noted: 2025-01-10

## 2025-01-10 PROBLEM — E10.9 DIABETES MELLITUS TYPE 1 (HCC): Status: ACTIVE | Noted: 2025-01-10

## 2025-01-10 LAB
GLUCOSE SERPL-MCNC: 172 MG/DL (ref 65–140)
GLUCOSE SERPL-MCNC: 196 MG/DL (ref 65–140)

## 2025-01-10 PROCEDURE — 59820 CARE OF MISCARRIAGE: CPT | Performed by: STUDENT IN AN ORGANIZED HEALTH CARE EDUCATION/TRAINING PROGRAM

## 2025-01-10 PROCEDURE — 88182 CELL MARKER STUDY: CPT | Performed by: PATHOLOGY

## 2025-01-10 PROCEDURE — 88342 IMHCHEM/IMCYTCHM 1ST ANTB: CPT | Performed by: PATHOLOGY

## 2025-01-10 PROCEDURE — 88305 TISSUE EXAM BY PATHOLOGIST: CPT | Performed by: PATHOLOGY

## 2025-01-10 PROCEDURE — 82948 REAGENT STRIP/BLOOD GLUCOSE: CPT

## 2025-01-10 RX ORDER — EPHEDRINE SULFATE 50 MG/ML
INJECTION INTRAVENOUS AS NEEDED
Status: DISCONTINUED | OUTPATIENT
Start: 2025-01-10 | End: 2025-01-10

## 2025-01-10 RX ORDER — PROPOFOL 10 MG/ML
INJECTION, EMULSION INTRAVENOUS AS NEEDED
Status: DISCONTINUED | OUTPATIENT
Start: 2025-01-10 | End: 2025-01-10

## 2025-01-10 RX ORDER — SODIUM CHLORIDE, SODIUM LACTATE, POTASSIUM CHLORIDE, CALCIUM CHLORIDE 600; 310; 30; 20 MG/100ML; MG/100ML; MG/100ML; MG/100ML
125 INJECTION, SOLUTION INTRAVENOUS CONTINUOUS
Status: DISCONTINUED | OUTPATIENT
Start: 2025-01-10 | End: 2025-01-10 | Stop reason: HOSPADM

## 2025-01-10 RX ORDER — MIDAZOLAM HYDROCHLORIDE 2 MG/2ML
INJECTION, SOLUTION INTRAMUSCULAR; INTRAVENOUS AS NEEDED
Status: DISCONTINUED | OUTPATIENT
Start: 2025-01-10 | End: 2025-01-10

## 2025-01-10 RX ORDER — ONDANSETRON 2 MG/ML
4 INJECTION INTRAMUSCULAR; INTRAVENOUS EVERY 6 HOURS PRN
Status: DISCONTINUED | OUTPATIENT
Start: 2025-01-10 | End: 2025-01-10 | Stop reason: HOSPADM

## 2025-01-10 RX ORDER — ACETAMINOPHEN 325 MG/1
975 TABLET ORAL ONCE
Status: COMPLETED | OUTPATIENT
Start: 2025-01-10 | End: 2025-01-10

## 2025-01-10 RX ORDER — HYDROMORPHONE HCL/PF 1 MG/ML
0.5 SYRINGE (ML) INJECTION
Status: DISCONTINUED | OUTPATIENT
Start: 2025-01-10 | End: 2025-01-10 | Stop reason: HOSPADM

## 2025-01-10 RX ORDER — ACETAMINOPHEN 325 MG/1
975 TABLET ORAL EVERY 6 HOURS PRN
Status: DISCONTINUED | OUTPATIENT
Start: 2025-01-10 | End: 2025-01-10 | Stop reason: HOSPADM

## 2025-01-10 RX ORDER — ALBUTEROL SULFATE 0.83 MG/ML
2.5 SOLUTION RESPIRATORY (INHALATION) ONCE AS NEEDED
Status: DISCONTINUED | OUTPATIENT
Start: 2025-01-10 | End: 2025-01-10 | Stop reason: HOSPADM

## 2025-01-10 RX ORDER — CELECOXIB 100 MG/1
200 CAPSULE ORAL ONCE
Status: COMPLETED | OUTPATIENT
Start: 2025-01-10 | End: 2025-01-10

## 2025-01-10 RX ORDER — LIDOCAINE HYDROCHLORIDE 10 MG/ML
INJECTION, SOLUTION EPIDURAL; INFILTRATION; INTRACAUDAL; PERINEURAL AS NEEDED
Status: DISCONTINUED | OUTPATIENT
Start: 2025-01-10 | End: 2025-01-10

## 2025-01-10 RX ORDER — FENTANYL CITRATE 50 UG/ML
INJECTION, SOLUTION INTRAMUSCULAR; INTRAVENOUS AS NEEDED
Status: DISCONTINUED | OUTPATIENT
Start: 2025-01-10 | End: 2025-01-10

## 2025-01-10 RX ORDER — KETOROLAC TROMETHAMINE 30 MG/ML
INJECTION, SOLUTION INTRAMUSCULAR; INTRAVENOUS AS NEEDED
Status: DISCONTINUED | OUTPATIENT
Start: 2025-01-10 | End: 2025-01-10

## 2025-01-10 RX ORDER — ONDANSETRON 2 MG/ML
INJECTION INTRAMUSCULAR; INTRAVENOUS AS NEEDED
Status: DISCONTINUED | OUTPATIENT
Start: 2025-01-10 | End: 2025-01-10

## 2025-01-10 RX ORDER — MISOPROSTOL 100 UG/1
TABLET ORAL AS NEEDED
Status: DISCONTINUED | OUTPATIENT
Start: 2025-01-10 | End: 2025-01-10 | Stop reason: HOSPADM

## 2025-01-10 RX ORDER — FENTANYL CITRATE/PF 50 MCG/ML
25 SYRINGE (ML) INJECTION
Status: DISCONTINUED | OUTPATIENT
Start: 2025-01-10 | End: 2025-01-10 | Stop reason: HOSPADM

## 2025-01-10 RX ORDER — ONDANSETRON 2 MG/ML
4 INJECTION INTRAMUSCULAR; INTRAVENOUS ONCE AS NEEDED
Status: DISCONTINUED | OUTPATIENT
Start: 2025-01-10 | End: 2025-01-10 | Stop reason: HOSPADM

## 2025-01-10 RX ORDER — GLYCOPYRROLATE 0.2 MG/ML
INJECTION INTRAMUSCULAR; INTRAVENOUS AS NEEDED
Status: DISCONTINUED | OUTPATIENT
Start: 2025-01-10 | End: 2025-01-10

## 2025-01-10 RX ORDER — IBUPROFEN 600 MG/1
600 TABLET, FILM COATED ORAL EVERY 6 HOURS PRN
Status: DISCONTINUED | OUTPATIENT
Start: 2025-01-10 | End: 2025-01-10 | Stop reason: HOSPADM

## 2025-01-10 RX ORDER — METOCLOPRAMIDE HYDROCHLORIDE 5 MG/ML
10 INJECTION INTRAMUSCULAR; INTRAVENOUS ONCE AS NEEDED
Status: DISCONTINUED | OUTPATIENT
Start: 2025-01-10 | End: 2025-01-10 | Stop reason: HOSPADM

## 2025-01-10 RX ADMIN — FENTANYL CITRATE 50 MCG: 50 INJECTION INTRAMUSCULAR; INTRAVENOUS at 08:04

## 2025-01-10 RX ADMIN — CELECOXIB 200 MG: 100 CAPSULE ORAL at 07:12

## 2025-01-10 RX ADMIN — SODIUM CHLORIDE, SODIUM LACTATE, POTASSIUM CHLORIDE, AND CALCIUM CHLORIDE: .6; .31; .03; .02 INJECTION, SOLUTION INTRAVENOUS at 07:59

## 2025-01-10 RX ADMIN — ACETAMINOPHEN 975 MG: 325 TABLET, FILM COATED ORAL at 07:12

## 2025-01-10 RX ADMIN — ONDANSETRON 4 MG: 2 INJECTION, SOLUTION INTRAMUSCULAR; INTRAVENOUS at 08:11

## 2025-01-10 RX ADMIN — FENTANYL CITRATE 25 MCG: 50 INJECTION INTRAMUSCULAR; INTRAVENOUS at 08:44

## 2025-01-10 RX ADMIN — DEXMEDETOMIDINE 8 MCG: 100 INJECTION, SOLUTION INTRAVENOUS at 08:04

## 2025-01-10 RX ADMIN — MIDAZOLAM HYDROCHLORIDE 2 MG: 1 INJECTION, SOLUTION INTRAMUSCULAR; INTRAVENOUS at 07:59

## 2025-01-10 RX ADMIN — PROPOFOL 200 MG: 10 INJECTION, EMULSION INTRAVENOUS at 08:04

## 2025-01-10 RX ADMIN — LIDOCAINE HYDROCHLORIDE 50 MG: 10 INJECTION, SOLUTION EPIDURAL; INFILTRATION; INTRACAUDAL at 08:04

## 2025-01-10 RX ADMIN — EPHEDRINE SULFATE 10 MG: 50 INJECTION INTRAVENOUS at 08:15

## 2025-01-10 RX ADMIN — DOXYCYCLINE 200 MG: 100 INJECTION, POWDER, LYOPHILIZED, FOR SOLUTION INTRAVENOUS at 08:08

## 2025-01-10 RX ADMIN — GLYCOPYRROLATE 0.1 MG: 0.2 INJECTION, SOLUTION INTRAMUSCULAR; INTRAVENOUS at 08:11

## 2025-01-10 RX ADMIN — FENTANYL CITRATE 25 MCG: 50 INJECTION INTRAMUSCULAR; INTRAVENOUS at 08:34

## 2025-01-10 RX ADMIN — EPHEDRINE SULFATE 5 MG: 50 INJECTION INTRAVENOUS at 08:49

## 2025-01-10 RX ADMIN — EPHEDRINE SULFATE 5 MG: 50 INJECTION INTRAVENOUS at 08:43

## 2025-01-10 RX ADMIN — SODIUM CHLORIDE, SODIUM LACTATE, POTASSIUM CHLORIDE, AND CALCIUM CHLORIDE: .6; .31; .03; .02 INJECTION, SOLUTION INTRAVENOUS at 08:59

## 2025-01-10 RX ADMIN — KETOROLAC TROMETHAMINE 30 MG: 30 INJECTION, SOLUTION INTRAMUSCULAR; INTRAVENOUS at 08:48

## 2025-01-10 NOTE — ANESTHESIA POSTPROCEDURE EVALUATION
Post-Op Assessment Note    CV Status:  Stable  Pain Score: 0    Pain management: adequate    Multimodal analgesia used between 6 hours prior to anesthesia start to PACU discharge    Mental Status:  Arousable and sleepy   Hydration Status:  Euvolemic   PONV Controlled:  Controlled   Airway Patency:  Patent     Post Op Vitals Reviewed: Yes    No anethesia notable event occurred.    Staff: CRNA           Last Filed PACU Vitals:  Vitals Value Taken Time   Temp 98    Pulse 67    /54    Resp 13    100 100

## 2025-01-10 NOTE — OP NOTE
OPERATIVE REPORT  PATIENT NAME: Gregoria Martin    :  1995  MRN: [id number]  Pt Location: AN OR ROOM 01    SURGERY DATE: 1/10/2025    Surgeons and Role:     * Leslie Dunbar MD - Primary     * Luc Billings MD - Assisting     * Zuly Witt MD - Assisting    Preop Diagnosis:  Missed ab [O02.1]    Post-Op Diagnosis Codes:     * Missed ab [O02.1]    Procedure(s):  DILATATION AND EVACUATION (D&E) 13 WEEKS. EXAM UNDER ANESTHESIA    Specimen(s):  ID Type Source Tests Collected by Time Destination   1 : gestational weeks=13 POC Tissue Products of Conception TISSUE EXAM Leslie Dunbar MD 1/10/2025 0847        Estimated Blood Loss:   250 mL    Drains:  * No LDAs found *    Anesthesia Type:   IV Sedation with Anesthesia    Operative Indications:  Missed ab [O02.1]      Operative Findings:  1.  External genitalia grossly normal in appearance.  No ulcerations, no lacerations, no lesions.  Bimanual exam revealed anteverted uterus with normal contours and freely mobile.  No adnexal masses palpated bilaterally.  2.  Vagina and cervix were grossly normal in appearance without any lacerations or lesions. Parous cerix.   3. Transabdominal ultrasound at the conclusion of the procedure demonstrated no further products of conception in the uterus.     Complications:   None apparent    Procedure and Technique:  Brief History    All risks, benefits, and alternatives to the procedure were discussed with the patient and she had the opportunity to ask questions. Informed consent was obtained.     Description of Procedure    Patient was taken to the operating room where anesthesia was administered, and the patient was positioned on the OR table in the dorsal lithotomy position. All pressure points were padded and a dotty huggeer was placed to maintain control of core body temperature. A bimanual exam was performed and the uterus was noted to be anteverted. The patient was prepped and draped in the usual sterile fashion. A  time out was performed to confirm correct patient and correct procedure.     Operative Technique    A Shona retractor was inserted into the vagina and a Olga retractor was used to visualize the anterior lip of the cervix, which was then grasped with an Allis clamp. The cervix was serially dilated to 43Fr under ultrasound guidance.     Decision was made to start with a 14 mm curved suction curette, which was introduced into the uterus under ultrasound guidance.  Suction was initiated and the uterine contents were evacuated. Several passes were then made with a 12 mm curved suction curette followed by an 8 mm straight suction curette for further evacuation of remaining products of conception. The products of conception were collected in the suction trap. The uterus was felt to clamp down. Several passes with the suction cannula were completed throughought the uterine cavity until no further tissue was obtained and no further uterine contents were visualized on transabdominal ultrasound. After several passes, continued bleeding was noted from the cervical os, so bimanual massage was performed until uterine tone improved. At the conclusion of the procedure, 600mcg cytotec was placed rectally.    The Allis was removed and sites were noted to be hemostatic. Excellent hemostasis was noted from the cervical os.     At the conclusion of the procedure, all needle, sponge, and instrument counts were noted to be correct x2. Patient tolerated the procedure well and was transferred to PACU in stable condition prior to discharge with follow up in 1-2 weeks.      Patient Disposition:  PACU            SIGNATURE: Zuly Witt MD  DATE: January 10, 2025  TIME: 9:09 AM

## 2025-01-10 NOTE — INTERVAL H&P NOTE
H&P reviewed. After examining the patient I find no changes in the patients condition since the H&P had been written.    Vitals:    01/10/25 0657   BP: 102/62   Pulse: 78   Resp: 18   Temp: (!) 97.4 °F (36.3 °C)   SpO2: 100%

## 2025-01-10 NOTE — ANESTHESIA POSTPROCEDURE EVALUATION
Post-Op Assessment Note    CV Status:  Stable    Pain management: adequate       Mental Status:  Alert and awake   Hydration Status:  Euvolemic   PONV Controlled:  Controlled   Airway Patency:  Patent     Post Op Vitals Reviewed: Yes    No anethesia notable event occurred.    Staff: Anesthesiologist           Last Filed PACU Vitals:  Vitals Value Taken Time   Temp 98 °F (36.7 °C) 01/10/25 0909   Pulse 108 01/10/25 0929   /57 01/10/25 0915   Resp 31 01/10/25 0929   SpO2 99 % 01/10/25 0929   Vitals shown include unfiled device data.    Modified Sarah:     Vitals Value Taken Time   Activity 2 01/10/25 0909   Respiration 2 01/10/25 0909   Circulation 2 01/10/25 0909   Consciousness 1 01/10/25 0909   Oxygen Saturation 1 01/10/25 0909     Modified Sarah Score: 8

## 2025-01-10 NOTE — ANESTHESIA PREPROCEDURE EVALUATION
Procedure:  DILATATION AND EVACUATION (D&E) 13 WEEKS, EXAM UNDER ANESTHESIA (Uterus)    Relevant Problems   ENDO   (+) Diabetes mellitus type 1 (HCC)      PULMONARY   (+) Asthma      Obstetrics/Gynecology   (+) Missed  with fetal demise before 20 completed weeks of gestation        Physical Exam    Airway    Mallampati score: II  TM Distance: >3 FB  Neck ROM: full     Dental   No notable dental hx     Cardiovascular      Pulmonary      Other Findings  post-pubertal.      Anesthesia Plan  ASA Score- 2     Anesthesia Type- general with ASA Monitors.         Additional Monitors:     Airway Plan: LMA.           Plan Factors-Exercise tolerance (METS): >4 METS.    Chart reviewed.   Existing labs reviewed. Patient summary reviewed.    Patient is not a current smoker.  Patient did not smoke on day of surgery.            Induction- intravenous.    Postoperative Plan- Plan for postoperative opioid use.     Perioperative Resuscitation Plan - Level 1 - Full Code.       Informed Consent- Anesthetic plan and risks discussed with patient.  I personally reviewed this patient with the CRNA. Discussed and agreed on the Anesthesia Plan with the CRNA..

## 2025-01-14 ENCOUNTER — TELEPHONE (OUTPATIENT)
Dept: LABOR AND DELIVERY | Facility: HOSPITAL | Age: 30
End: 2025-01-14

## 2025-01-14 PROCEDURE — 88305 TISSUE EXAM BY PATHOLOGIST: CPT | Performed by: PATHOLOGY

## 2025-01-14 PROCEDURE — 88342 IMHCHEM/IMCYTCHM 1ST ANTB: CPT | Performed by: PATHOLOGY

## 2025-01-14 NOTE — TELEPHONE ENCOUNTER
Called to check in  Hasn't had any pain  No crazy bleeding  Only some spotting, nothing too heavy  No fevers    Eating ok, good appetite, no nausea or vomiting    Hx of 1xSAB, this is her second loss,     Offered [facility]  loss support group info, feeling very well supported from family and friends, not interested in this at the moment    Discussed scheduling follow-up, some patients opt for time away from our office after loss, and as long as feeling well that may be appropriate. She would prefer to schedule postop followup appt.    Reviewed call precautions: heavy vaginal bleeding, severe pain, fevers, any other concerns.    CFW team, can someone please call her to review openings for postop follow-up after loss (from 2-4 weeks after procedure 1/10/25.) please call her to review availability.    Thanks!

## (undated) DEVICE — NEEDLE SPINAL 22G X 1 1/2

## (undated) DEVICE — D + E CONNECTION HOSE

## (undated) DEVICE — CHLORHEXIDINE 4PCT 4 OZ

## (undated) DEVICE — PAD SANITARY

## (undated) DEVICE — COLLECTION SET, DISPOSABLE WITH HANDLE AND TAPERED FITTINGS TUBING, 6 FT (183 CM) (10/PK): Brand: GYRUS ACMI

## (undated) DEVICE — CURETTE VACURETTE CRVD 14MM

## (undated) DEVICE — BERKELEY 1/2" (13MM) COLLECTION SET, DISPOSABLE W/HANDLE AND TAPERED FITTING TUBING, 6 FT (183 CM) (10/PK): Brand: BERKELEY

## (undated) DEVICE — STRL ALLENTOWN HYSTEROSCOPY PK: Brand: CARDINAL HEALTH

## (undated) DEVICE — GLOVE INDICATOR PI UNDERGLOVE SZ 7 BLUE

## (undated) DEVICE — GLOVE PI ULTRA TOUCH SZ.6.5

## (undated) DEVICE — STERILE LUBRICATING JELLY, TUBE: Brand: HR LUBRICATING JELLY

## (undated) DEVICE — PREMIUM DRY TRAY LF: Brand: MEDLINE INDUSTRIES, INC.

## (undated) DEVICE — CURETTE VACURETTE STR 8MM

## (undated) DEVICE — SPECIMEN CONTAINER STERILE PEEL PACK

## (undated) DEVICE — CURETTE VACURETTE CRVD 12MM

## (undated) DEVICE — D + E SUCTION CANISTER

## (undated) DEVICE — COVER PROBE ULTRASOUND

## (undated) DEVICE — D + E TUBING W /FILTER